# Patient Record
Sex: FEMALE | Race: WHITE | NOT HISPANIC OR LATINO | Employment: FULL TIME | URBAN - METROPOLITAN AREA
[De-identification: names, ages, dates, MRNs, and addresses within clinical notes are randomized per-mention and may not be internally consistent; named-entity substitution may affect disease eponyms.]

---

## 2018-01-29 ENCOUNTER — OFFICE VISIT CONVERTED (OUTPATIENT)
Dept: CARDIOLOGY | Facility: CLINIC | Age: 48
End: 2018-01-29
Attending: INTERNAL MEDICINE

## 2018-04-05 ENCOUNTER — OFFICE VISIT CONVERTED (OUTPATIENT)
Dept: UROLOGY | Facility: CLINIC | Age: 48
End: 2018-04-05
Attending: UROLOGY

## 2018-04-20 ENCOUNTER — OFFICE VISIT CONVERTED (OUTPATIENT)
Dept: SURGERY | Facility: CLINIC | Age: 48
End: 2018-04-20
Attending: SURGERY

## 2018-05-21 ENCOUNTER — OFFICE VISIT CONVERTED (OUTPATIENT)
Dept: CARDIOLOGY | Facility: CLINIC | Age: 48
End: 2018-05-21
Attending: INTERNAL MEDICINE

## 2018-06-29 ENCOUNTER — CONVERSION ENCOUNTER (OUTPATIENT)
Dept: CARDIOLOGY | Facility: CLINIC | Age: 48
End: 2018-06-29
Attending: INTERNAL MEDICINE

## 2019-03-26 ENCOUNTER — OFFICE VISIT CONVERTED (OUTPATIENT)
Dept: CARDIOLOGY | Facility: CLINIC | Age: 49
End: 2019-03-26
Attending: INTERNAL MEDICINE

## 2019-03-28 ENCOUNTER — HOSPITAL ENCOUNTER (OUTPATIENT)
Dept: INFUSION THERAPY | Facility: HOSPITAL | Age: 49
Setting detail: HOSPITAL OUTPATIENT SURGERY
Discharge: HOME | End: 2019-03-28
Attending: INTERNAL MEDICINE

## 2019-03-28 LAB
ANION GAP SERPL CALC-SCNC: 14 MMOL/L (ref 8–19)
BASOPHILS # BLD AUTO: 0.04 10*3/UL (ref 0–0.2)
BASOPHILS NFR BLD AUTO: 0.5 % (ref 0–3)
BUN SERPL-MCNC: 11 MG/DL (ref 5–25)
BUN/CREAT SERPL: 14 {RATIO} (ref 6–20)
CALCIUM SERPL-MCNC: 9.5 MG/DL (ref 8.7–10.4)
CHLORIDE SERPL-SCNC: 110 MMOL/L (ref 99–111)
CONV ABS IMM GRAN: 0.03 10*3/UL (ref 0–0.2)
CONV CO2: 24 MMOL/L (ref 22–32)
CONV IMMATURE GRAN: 0.4 % (ref 0–1.8)
CREAT UR-MCNC: 0.76 MG/DL (ref 0.5–0.9)
DEPRECATED RDW RBC AUTO: 42.3 FL (ref 36.4–46.3)
EOSINOPHIL # BLD AUTO: 0.12 10*3/UL (ref 0–0.7)
EOSINOPHIL # BLD AUTO: 1.6 % (ref 0–7)
ERYTHROCYTE [DISTWIDTH] IN BLOOD BY AUTOMATED COUNT: 12.4 % (ref 11.7–14.4)
GFR SERPLBLD BASED ON 1.73 SQ M-ARVRAT: >60 ML/MIN/{1.73_M2}
GLUCOSE SERPL-MCNC: 106 MG/DL (ref 65–99)
HBA1C MFR BLD: 12.7 G/DL (ref 12–16)
HCT VFR BLD AUTO: 38.4 % (ref 37–47)
INR PPP: 0.95 (ref 2–3)
LYMPHOCYTES # BLD AUTO: 3 10*3/UL (ref 1–5)
MCH RBC QN AUTO: 30.8 PG (ref 27–31)
MCHC RBC AUTO-ENTMCNC: 33.1 G/DL (ref 33–37)
MCV RBC AUTO: 93 FL (ref 81–99)
MONOCYTES # BLD AUTO: 0.47 10*3/UL (ref 0.2–1.2)
MONOCYTES NFR BLD AUTO: 6.2 % (ref 3–10)
NEUTROPHILS # BLD AUTO: 3.93 10*3/UL (ref 2–8)
NEUTROPHILS NFR BLD AUTO: 51.8 % (ref 30–85)
NRBC CBCN: 0 % (ref 0–0.7)
OSMOLALITY SERPL CALC.SUM OF ELEC: 298 MOSM/KG (ref 273–304)
PLATELET # BLD AUTO: 322 10*3/UL (ref 130–400)
PMV BLD AUTO: 9.6 FL (ref 9.4–12.3)
POTASSIUM SERPL-SCNC: 4 MMOL/L (ref 3.5–5.3)
PROTHROMBIN TIME: 10 S (ref 9.4–12)
RBC # BLD AUTO: 4.13 10*6/UL (ref 4.2–5.4)
SODIUM SERPL-SCNC: 144 MMOL/L (ref 135–147)
VARIANT LYMPHS NFR BLD MANUAL: 39.5 % (ref 20–45)
WBC # BLD AUTO: 7.59 10*3/UL (ref 4.8–10.8)

## 2019-07-02 ENCOUNTER — OFFICE VISIT CONVERTED (OUTPATIENT)
Dept: CARDIOLOGY | Facility: CLINIC | Age: 49
End: 2019-07-02
Attending: INTERNAL MEDICINE

## 2019-09-21 ENCOUNTER — HOSPITAL ENCOUNTER (OUTPATIENT)
Dept: URGENT CARE | Facility: CLINIC | Age: 49
Discharge: HOME | End: 2019-09-21

## 2019-09-23 LAB — BACTERIA UR CULT: NORMAL

## 2019-09-30 ENCOUNTER — OFFICE VISIT CONVERTED (OUTPATIENT)
Dept: SURGERY | Facility: CLINIC | Age: 49
End: 2019-09-30
Attending: PHYSICIAN ASSISTANT

## 2019-09-30 ENCOUNTER — CONVERSION ENCOUNTER (OUTPATIENT)
Dept: SURGERY | Facility: CLINIC | Age: 49
End: 2019-09-30

## 2019-09-30 ENCOUNTER — HOSPITAL ENCOUNTER (OUTPATIENT)
Dept: SURGERY | Facility: CLINIC | Age: 49
Discharge: HOME | End: 2019-09-30
Attending: PHYSICIAN ASSISTANT

## 2019-10-02 LAB — BACTERIA UR CULT: NORMAL

## 2020-01-20 ENCOUNTER — OFFICE VISIT CONVERTED (OUTPATIENT)
Dept: CARDIOLOGY | Facility: CLINIC | Age: 50
End: 2020-01-20
Attending: INTERNAL MEDICINE

## 2020-11-02 ENCOUNTER — OFFICE VISIT CONVERTED (OUTPATIENT)
Dept: CARDIOLOGY | Facility: CLINIC | Age: 50
End: 2020-11-02
Attending: INTERNAL MEDICINE

## 2021-03-19 ENCOUNTER — HOSPITAL ENCOUNTER (OUTPATIENT)
Dept: OTHER | Facility: HOSPITAL | Age: 51
Discharge: HOME | End: 2021-03-19
Attending: FAMILY MEDICINE

## 2021-03-22 ENCOUNTER — OFFICE VISIT CONVERTED (OUTPATIENT)
Dept: UROLOGY | Facility: CLINIC | Age: 51
End: 2021-03-22
Attending: UROLOGY

## 2021-03-22 LAB
BILIRUB UR QL STRIP: NEGATIVE
COLOR UR: YELLOW
CONV BACTERIA IN URINE MICRO: 0
CONV CALCIUM OXALATE CRYSTALS /HPF IN URINE SEDIMENT BY MICROSCOPY: 0
CONV CLARITY OF URINE: CLEAR
CONV PROTEIN IN URINE BY AUTOMATED TEST STRIP: 100
CONV UROBILINOGEN IN URINE BY AUTOMATED TEST STRIP: 0.2
GLUCOSE UR QL: NEGATIVE
HGB UR QL STRIP: NORMAL
KETONES UR QL STRIP: NEGATIVE
LEUKOCYTE ESTERASE UR QL STRIP: NORMAL
NITRITE UR QL STRIP: NEGATIVE
PH UR STRIP.AUTO: 6.5 [PH]
RBC #/AREA URNS HPF: NORMAL /[HPF]
RENAL EPI CELLS #/AREA URNS HPF: 0 /[HPF]
SP GR UR: >=1.03
SQUAMOUS SPT QL MICRO: 0
WBC #/AREA URNS HPF: 0 /[HPF]

## 2021-03-23 ENCOUNTER — HOSPITAL ENCOUNTER (OUTPATIENT)
Dept: PREADMISSION TESTING | Facility: HOSPITAL | Age: 51
Discharge: HOME | End: 2021-03-23
Attending: UROLOGY

## 2021-03-24 LAB — SARS-COV-2 RNA SPEC QL NAA+PROBE: NOT DETECTED

## 2021-03-26 ENCOUNTER — HOSPITAL ENCOUNTER (OUTPATIENT)
Dept: PERIOP | Facility: HOSPITAL | Age: 51
Setting detail: HOSPITAL OUTPATIENT SURGERY
Discharge: HOME | End: 2021-03-26
Attending: UROLOGY

## 2021-03-31 ENCOUNTER — TELEMEDICINE CONVERTED (OUTPATIENT)
Dept: UROLOGY | Facility: CLINIC | Age: 51
End: 2021-03-31
Attending: UROLOGY

## 2021-04-03 LAB
COLOR STONE: NORMAL
COMPN STONE: NORMAL
CONV CA OXALATE DIHYDRATE: 95 %
CONV CALCULI COMMENT: NORMAL
CONV CALCULI DISCLAIMER: NORMAL
CONV CALCULI NOTE: NORMAL
CONV PHOTO (CALCULI): NORMAL
HYDROXYAPATITE: 5 %
SIZE STONE: NORMAL MM
WT STONE: 11 MG

## 2021-04-20 ENCOUNTER — CONVERSION ENCOUNTER (OUTPATIENT)
Dept: OTHER | Facility: HOSPITAL | Age: 51
End: 2021-04-20

## 2021-04-20 ENCOUNTER — OFFICE VISIT CONVERTED (OUTPATIENT)
Dept: CARDIOLOGY | Facility: CLINIC | Age: 51
End: 2021-04-20
Attending: INTERNAL MEDICINE

## 2021-05-10 NOTE — H&P
"   History and Physical      Patient Name: Mariam Rincon   Patient ID: [id number]   Sex: Female   YOB: 1970    Referring Provider: Hawa Daniels    Visit Date: March 22, 2021    Provider: Loretta Leal MD   Location: [facility]   Location Address: [address] [place] [address]  [id number]   Location Phone: [phone]          Chief Complaint  · \"I have a kidney stone\"      History Of Present Illness  The patient is an 51 year old /White female, who is sent by the Emergency Room physician, for the evaluation of right flank pain.   She has had symptoms recently, but none currently.   Recent diagnostic studies were done days ago and include stone protocol CT. The studies have shown a right renal stone. The right renal stone size is described as 1.5cm.   The patient has no additional complaints. She denies nausea, vomiting, fever, and chills.   The problem is made worse by no known factors. The problem is relieved by no known factors.   Her past medical history is negative for renal stones.   Her family history is non-contributory.       Past Medical History  Abdominal Pain; Acute allergic reaction, Alpha Gal positive; Anemia, Unspecified; Arthritis; Asthma; Bundle Branch Block, left, other; Diabetes; Fibromyalgia; Heart Disease; Hematuria; High blood pressure; Kidney calculus; Kidney stones; Migraine Headaches; Myofascial pain; Nephrolithiasis; Overactive bladder; Pain, Chronic Pain Syndrome; Pain, Joint; Shingles         Past Surgical History  Abdominoplasty; Cardiac Catherization; Cesarian Section; Cholecystectomy; Hysterectomy-Abdominal; Kidney Stone Surgery, Unspecified         Medication List  albuterol sulfate inhalation; aspirin 81 mg oral tablet,delayed release (DR/EC); carvedilol 6.25 mg oral tablet; cyclobenzaprine oral; estradiol 1 mg oral tablet; famotidine 40 mg oral tablet; fiber oral powder; Fish Oil Oral 50 mg; isosorbide dinitrate 30 mg oral " "tablet; losartan 50 mg oral tablet; ondansetron 4 mg oral tablet,disintegrating; oxybutynin chloride 5 mg oral tablet extended release 24hr; pantoprazole 40 mg oral tablet,delayed release (DR/EC); phenazopyridine 100 mg oral tablet; rosuvastatin 5 mg oral tablet; trazodone 50 mg Oral Tablet; Vitamin D3 oral         Allergy List  Adhesives; Antihistamine       Allergies Reconciled  Family Medical History  Colon Neoplasm, Sigmoid, Malignant; Lung Neoplasm, Malignant; Stroke; Heart Disease; Diabetes, unspecified type; Family history of colon cancer; Family history of breast cancer         Social History  Tobacco (Never)         Review of Systems  · Constitutional  o Denies  o : fever, chills  · Eyes  o Denies  o : double vision, cataracts  · HENT  o Denies  o : hearing loss, headaches  · Cardiovascular  o Denies  o : chest pain at rest, chest pain with exercise, irregular heart beats, palpitations, leg cramps with exercise  · Respiratory  o Denies  o : shortness of breath, wheezing, sleep apnea  · Gastrointestinal  o Denies  o : heartburn or indigestion, nausea or vomiting, change in abdominal girth, diarrhea, constipation, blood in stools  · Genitourinary  o Admits  o : additional symptoms as noted in HPI  · Integument  o Denies  o : rash, new skin lesions  · Neurologic  o Denies  o : memory difficulties, headache, mini-strokes, seizures  · Endocrine  o Denies  o : hot flashes, thyroid disorders  · Psychiatric  o Denies  o : depression, schizophrenia, bipolar disorder  · Heme-Lymph  o Denies  o : easy bleeding, easy bruising, sickle cell disease or trait, lymph node enlargement or tenderness  · Allergic-Immunologic  o Denies  o : immune deficiency, HIV, Hepatitis C      Vitals  Date Time BP Position Site L\R Cuff Size HR RR TEMP (F) WT  HT  BMI kg/m2 BSA m2 O2 Sat FR L/min FiO2 HC       03/22/2021 04:34 /77 Sitting       191lbs 0oz 5'  6\" 30.83 2.01             Physical " Examination  · Constitutional  o Appearance  o : Well nourished, well developed patient in no acute distress. Ambulating without difficulty.  · Respiratory  o Respiratory Effort  o : Breathing is unlabored without accessory muscle use  · Skin and Subcutaneous Tissue  o General Inspection  o : No rashes, lesions or areas of discoloration present. Skin turgor is normal.          Results  · In-Office Procedures  o Lab procedure  § Automated dipstick urinalysis with microscopy ([id number])   § Color Ur: Yellow   § Clarity Ur: Clear   § Glucose Ur Ql Strip: Negative   § Bilirub Ur Ql Strip: Negative   § Ketones Ur Ql Strip: Negative   § Sp Gr Ur Qn: >=1.030   § Hgb Ur Ql Strip: Large   § pH Ur-LsCnc: 6.5   § Prot Ur Ql Strip: 100   § Urobilinogen Ur Strip-mCnc: 0.2   § Nitrite Ur Ql Strip: Negative   § WBC Est Ur Ql Strip: Small   § RBC UrnS Qn HPF: tntc   § WBC UrnS Qn HPF: 0   § Bacteria UrnS Qn HPF: 0   § Crystals UrnS Qn HPF: 0   § Epithelial Cells (non renal): 0 /HPF  § Epithelial Cells (renal): 0       Assessment  · Nephrolithiasis     592.0/N20.0      Plan  · Medications  o Medications have been Reconciled  o Transition of Care or Provider Policy  · Instructions  o DISCUSSION:  o The patient has developed a new eposide of stone disease. I have discussed the etiologies of stone disease with emphasis on treatment ,management and prevention. At this point, I think we can take a conservative approach. Patient will continue to strain all urine and RTC as instructed.   o Ureteroscopy and stone manipulation: Risks, benefits and alternatives were all explained. The patient understands the alternatives which include observation,open surgery, extracorporal shockwave lithotripsy and percutaneous nephrolithotomy. The plan is to perform ureteroscopy and stone manipulation with possible stent placement. Different techniques may be used to break up the stone. The patient clearly understands the risks which include but are not limited  to bleeding, infection, incomplete stone removal, stent pain and possible ureteral perforation. Multiple procedures may be needed to obtain a stone free state. All questions answered.  o PLAN:  o Schedule Ureteroscopy and Holmium laser litho and possible stent  o Electronically Identified Patient Education Materials Provided Electronically            Electronically Signed by: Loretta Leal MD -Author on March 22, 2021 05:06:12 PM

## 2021-05-13 NOTE — PROGRESS NOTES
Progress Note      Patient Name: Mariam Rincon   Patient ID: [id number]   Sex: Female   YOB: 1970    Primary Care Provider: Luis Alcala MD    Visit Date: November 2, 2020    Provider: Brendon Coy MD   Location: [facility]   Location Address: [address] [place] [address], [facility]   [place], [place]  [id number]   Location Phone: [phone]          Chief Complaint     Followup visit for coronary artery disease and hypertension.       History Of Present Illness  Mariam Rincon is a 50 year old /White female with nonobstructive coronary artery disease and a left bundle branch block who is here for a 9-month followup visit. For the past few days, her home blood pressure readings have been high with diastolic mostly at 100 and systolic in the 180s and sometimes even 200s. She reports some chest tightness when her blood pressure is high. Occasional palpitations are present. No pedal edema. No shortness of breath. Taking all the medications, including carvedilol, as prescribed.   PAST MEDICAL HISTORY: 1) Nonobstructive coronary artery disease. Cardiac catheterization done on 03/28/2019 showed 30-40% stenosis of the mid LAD; 2) Left bundle branch block; 3) Hypertension; 4) Hyperlipidemia.   PSYCHOSOCIAL HISTORY: Denies tobacco use. Rarely consumes alcohol.   CURRENT MEDICATIONS: Aspirin 81 mg daily; carvedilol 6.25 mg b.i.d.; rosuvastatin 5 mg daily; isosorbide mononitrate 30 mg daily; cyclobenzaprine 10 mg b.i.d.; oxybutynin 5 mg daily; Estradiol 1 mg daily; trazodone 50 mg daily; famotidine 20 mg daily; multivitamin daily.       Review of Systems  · Cardiovascular  o Admits  o : palpitations (fast, fluttering, or skipping beats), chest pain or angina pectoris   o Denies  o : swelling (feet, ankles, hands), shortness of breath while walking or lying flat  · Respiratory  o Denies  o : chronic or frequent cough, asthma or wheezing      Vitals  Date Time BP Position Site L\R Cuff Size HR  "RR TEMP (F) WT  HT  BMI kg/m2 BSA m2 O2 Sat FR L/min FiO2 HC       11/02/2020 02:53 /100 Sitting    74 - R   192lbs 0oz 5'  6\" 30.99 2.01       11/02/2020 02:53 /102 Sitting                       Physical Examination  · Respiratory  o Auscultation of Lungs  o : Clear to auscultation bilaterally. No crackles or rhonchi.  · Cardiovascular  o Heart  o : S1, S2 normally heard. No S3. No murmur, rubs, or gallops.  · Gastrointestinal  o Abdominal Examination  o : Soft, nontender, nondistended. No free fluid. Bowel sounds heard in all four quadrants.  · Extremities  o Extremities  o : Warm and well perfused. No pitting pedal edema. Distal pulses present.          Assessment     ASSESSMENT & PLAN:    1.  Hypertension.  Blood pressure not well controlled, and in fact, very high today.  She wants to go back to        Bystolic since that worked in the past.  We tried metoprolol and carvedilol without any major benefits.  We        will restart Bystolic at 10 mg p.o. once daily.  Until she gets the prescription filled, she is going to take        carvedilol 12.5 mg twice daily.  Recommended to keep a blood pressure log for the next 2 weeks, and if        her blood pressure remains uncontrolled, she will call us back.  2.  Nonobstructive coronary artery disease.  Continue aspirin, statin, and beta-blocker.  3.  Hyperlipidemia.  Continue Crestor.  Will check lipid panel before next visit.  4.  Will follow the blood pressure log; otherwise, follow up in 4 months.        MD BENJIE Aguilera:maya             Electronically Signed by: Jing Kate-, Other -Author on November 5, 2020 09:08:17 AM  Electronically Co-signed by: Brendon Coy MD -Reviewer on November 5, 2020 12:13:48 PM  "

## 2021-05-14 VITALS
WEIGHT: 192 LBS | SYSTOLIC BLOOD PRESSURE: 190 MMHG | BODY MASS INDEX: 30.86 KG/M2 | DIASTOLIC BLOOD PRESSURE: 100 MMHG | HEART RATE: 74 BPM | HEIGHT: 66 IN

## 2021-05-14 VITALS
DIASTOLIC BLOOD PRESSURE: 84 MMHG | WEIGHT: 188 LBS | HEIGHT: 66 IN | HEART RATE: 65 BPM | SYSTOLIC BLOOD PRESSURE: 150 MMHG | BODY MASS INDEX: 30.22 KG/M2

## 2021-05-14 VITALS
BODY MASS INDEX: 30.7 KG/M2 | HEIGHT: 66 IN | WEIGHT: 191 LBS | SYSTOLIC BLOOD PRESSURE: 149 MMHG | DIASTOLIC BLOOD PRESSURE: 77 MMHG

## 2021-05-14 NOTE — PROGRESS NOTES
Progress Note      Patient Name: Mariam Rincon   Patient ID: [id number]   Sex: Female   YOB: 1970    Primary Care Provider: Luis Alcala MD   Referring Provider: Hawa Daniels    Visit Date: April 20, 2021    Provider: Brendon Coy MD   Location: [facility]   Location Address: [address] [place] [address], [address]   [place]  [id number]   Location Phone: [phone]          Chief Complaint     Followup visit for coronary artery disease and hypertension.       History Of Present Illness  Mariam Rincon is a 51 year old /White female with nonobstructive coronary artery disease and hypertension who is here for a followup visit. She was seen in the office in November of last year. Previously, the patient was on Bystolic, and ever since the medication was changed to carvedilol due to insurance problems, patient's blood pressure was uncontrolled. She was seen by her primary care provider in February, and the systolic blood pressure was mostly in the 190s. She also felt symptoms, including dizziness, nausea, and palpitations. Her heart rate was on the lower side. The carvedilol dose was decreased to 6.25 mg, and she was put on losartan and ever since the blood pressure is better controlled. The palpitations have subsided. She occasionally gets chest pain, but very few times and not related to activities. Home blood pressure usually runs in the 140s.   PAST MEDICAL HISTORY: 1) Nonobstructive coronary artery disease. Cardiac catheterization done on 03/28/2019 showed 30-40% stenosis of the mid LAD; 2) Left bundle branch block; 3) Hypertension; 4) Hyperlipidemia.   PSYCHOSOCIAL HISTORY: Denies tobacco use. Rarely consumes alcohol.   CURRENT MEDICATIONS: Medications reviewed and are as documented.      ALLERGIES:  Adhesives; Antihistamine.       Review of Systems  · Cardiovascular  o Admits  o : shortness of breath while walking or lying flat, chest pain or angina pectoris  "  o Denies  o : palpitations (fast, fluttering, or skipping beats), swelling (feet, ankles, hands)  · Respiratory  o Denies  o : chronic or frequent cough      Vitals  Date Time BP Position Site L\R Cuff Size HR RR TEMP (F) WT  HT  BMI kg/m2 BSA m2 O2 Sat FR L/min FiO2        04/20/2021 03:07 /84 Sitting    65 - R   188lbs 0oz 5'  6\" 30.34 1.99             Physical Examination  · Respiratory  o Auscultation of Lungs  o : Clear to auscultation bilaterally. No crackles or rhonchi.  · Cardiovascular  o Heart  o : S1, S2 is normally heard. No S3. No murmur, rubs, or gallops.  · Gastrointestinal  o Abdominal Examination  o : Soft, nontender, nondistended. No free fluid. Bowel sounds heard in all four quadrants.  · Extremities  o Extremities  o : Warm and well perfused. No pitting pedal edema. Distal pulses present.  · Labs  o Labs  o : No recent labs available for review.          Assessment     ASSESSMENT & PLAN:    1.  Hypertension.  Blood pressure is reasonably well controlled with the carvedilol and losartan, which I will        continued.  She is advised to keep a blood pressure log.  If the systolic blood pressure is mostly in the        150s, she will call us back, and at that time, the losartan dose can be increased to 75 mg daily.    2.  Nonobstructive coronary artery disease.  Currently no angina-like symptoms.  Continue isosorbide, along        with aspirin and statin.    3.  Hyperlipidemia.  Continue rosuvastatin.  4.  Follow up in 6 months.             Electronically Signed by: Jing Kate-, Other -Author on April 24, 2021 05:18:35 PM  Electronically Co-signed by: Brendon Coy MD -Reviewer on April 25, 2021 08:58:14 AM  "

## 2021-05-14 NOTE — PROGRESS NOTES
Progress Note      Patient Name: Mariam Rincon   Patient ID: [id number]   Sex: Female   YOB: 1970    Referring Provider: Hawa Daniels    Visit Date: March 31, 2021    Provider: Loretta Leal MD   Location: [facility]   Location Address: [address] [place] [address]  [id number]   Location Phone: [phone]          Chief Complaint  · First postoperative visit      History Of Present Illness  Video Conferencing Visit  Mariam Rincon is a 51 year old /White female who is presenting for evaluation via video conferencing via [employer]. Verbal consent obtained before beginning visit.   The following staff were present during this visit: Shweta Braden   The patient returns for a scheduled post-operative visit after undergoing right ureteroscopy and laser litho and right ureteroscopy and stone basket extraction for right ureteral calculus on 03/26/2021. A double J stent was inserted but has been removed by patient at home.   Since the procedure, the patient has had persistent flank pain.       Past Medical History  Abdominal pain; Acute allergic reaction, Alpha Gal positive; Anemia, Unspecified; Arthritis; Asthma; Bundle Branch Block, left, other; Diabetes; Fibromyalgia; Heart Disease; Hematuria; High blood pressure; Kidney calculus; Kidney Stones; Migraine Headaches; Myofascial pain; Nephrolithiasis; Overactive bladder; Pain, Chronic Pain Syndrome; Pain, Joint; Shingles         Past Surgical History  Abdominoplasty; Cardiac Catherization; Cesarian Section; Cholecystectomy; Hysterectomy-Abdominal; Kidney Stone Surgery, Unspecified         Medication List  albuterol sulfate inhalation; aspirin 81 mg oral tablet,delayed release (DR/EC); carvedilol 6.25 mg oral tablet; cyclobenzaprine oral; estradiol 1 mg oral tablet; famotidine 40 mg oral tablet; fiber oral powder; Fish Oil Oral 50 mg; isosorbide dinitrate 30 mg oral tablet; losartan 50 mg oral tablet;  ondansetron 4 mg oral tablet,disintegrating; oxybutynin chloride 5 mg oral tablet extended release 24hr; pantoprazole 40 mg oral tablet,delayed release (DR/EC); phenazopyridine 100 mg oral tablet; rosuvastatin 5 mg oral tablet; trazodone 50 mg Oral Tablet; Vitamin D3 oral         Allergy List  Adhesives; Antihistamine       Allergies Reconciled  Family Medical History  Colon Neoplasm, Sigmoid, Malignant; Lung Neoplasm, Malignant; Stroke; Heart Disease; Diabetes, unspecified type; Family history of colon cancer; Family history of breast cancer         Social History  Tobacco (Never)         Review of Systems  · Constitutional  o Denies  o : fever, chills  · Gastrointestinal  o Denies  o : nausea, vomiting, change in abdominal girth, diarrhea, constipation, blood in stools  · Genitourinary  o Denies  o : additional symptoms, except as noted in HPI      Physical Examination  · Constitutional  o Appearance  o : Well nourished, well developed patient in no acute distress. Ambulating without difficulty.              Assessment  · Calculi, ureter     592.1/N20.1      Plan  · Orders  o KUB xray [facility] (87729) - 592.1/N20.1 - 03/31/2022  · Medications  o Medications have been Reconciled  o Transition of Care or Provider Policy  · Instructions  o The right uteretral stent was removed at home without difficulty. The patient will return to the office in 12 months for KUB. I will see him/her sooner if he/she has any no stone symptoms. She will let me know if her pain persists.  o FOLLOW-UP:  o In 12 months            Electronically Signed by: Loretta Leal MD -Author on March 31, 2021 02:37:26 PM

## 2021-05-14 NOTE — PROGRESS NOTES
Progress Note      Patient Name: Mariam Rincon   Patient ID: [id number]   Sex: Female   YOB: 1970    Referring Provider: Hawa Daniels    Visit Date: March 22, 2021    Provider: Loretta Leal MD   Location: [facility]   Location Address: [address] [place] [address]  [id number]   Location Phone: [phone]          Chief Complaint  · Outpatient History & Physical / Surgical Orders      History Of Present Illness  [facility]  Outpatient History and Physical Surgical Orders  Preadmission Location: Phone Preadmission Time: 08:30 AM   Which Facility: [facility] Date: 03/26/2021 Preadmission Testing Date: 03/24/2021   Patient's Name: Mariam Rincon YOB: 1970   Chief complaint/history present illness: 1.6 CM Right Renal Pelvis Stone   Current Medication List: albuterol sulfate inhalation, aspirin 81 mg oral tablet,delayed release (DR/EC), carvedilol 6.25 mg oral tablet, cyclobenzaprine oral, estradiol 1 mg oral tablet, famotidine 40 mg oral tablet, fiber oral powder, Fish Oil Oral 50 mg, isosorbide dinitrate 30 mg oral tablet, losartan 50 mg oral tablet, ondansetron 4 mg oral tablet,disintegrating, oxybutynin chloride 5 mg oral tablet extended release 24hr, pantoprazole 40 mg oral tablet,delayed release (DR/EC), phenazopyridine 100 mg oral tablet, rosuvastatin 5 mg oral tablet, trazodone 50 mg Oral Tablet, and Vitamin D3 oral   Allergies: Adhesives and Antihistamine   Significant past medical: Abdominal Pain, Acute allergic reaction, Alpha Gal positive, Anemia, Unspecified, Arthritis, Asthma, Bundle Branch Block, left, other, Diabetes, Fibromyalgia, Heart Disease, Hematuria, High blood pressure, Kidney calculus, Kidney stones, Migraine Headaches, Myofascial pain, Nephrolithiasis, Overactive bladder, Pain, Chronic Pain Syndrome, Pain, Joint, and Shingles   Past Surgical History: Abdominoplasty, Cardiac Catherization,  "Cesarian Section, Cholecystectomy, Hysterectomy-Abdominal, and Kidney Stone Surgery, Unspecified   Examination of heart and lungs: Regular rate, rhythm, no murmur, gallop, rub, Breath sounds normal, no distress, and Abdomen soft, non-tender, BSx4 are positive         Allergy List    Allergies Reconciled  Vitals  Date Time BP Position Site L\R Cuff Size HR RR TEMP (F) WT  HT  BMI kg/m2 BSA m2 O2 Sat FR L/min FiO2 HC       03/22/2021 04:34 /77 Sitting       191lbs 0oz 5'  6\" 30.83 2.01                 Assessment  · Encounter for preoperative examination for general surgical procedure     V72.84/Z01.818  · Kidney calculus     592.0/N20.0      Plan  · Orders  o General Urology Surgery Order ([facility]) - V72.84/Z01.818, 592.0/N20.0 - 03/26/2021  o [facility] Pre-Op Covid-19 Screening (52347) - V72.84/Z01.818, 592.0/N20.0 - 03/23/2021  · Medications  o Medications have been Reconciled  o Transition of Care or Provider Policy  · Instructions  o *****Surgical Orders******  o Pre-Operative Orders: Sign permit for Right Ureteroscopy, Laser Lithotripsy, Insertion of Right Ureteral Stent  o ****Patient Status****  o Admit for INPATIENT services; Anticipated length of stay greater than two midnights  o Outpatient   o **PATIENT INSTRUCTED TO STOP ASPIRIN THREE (3) DAYS PRIOR TO PROCEDURE  o General Sedation  o IV Fluids: LR @ 100 cc/hour  o IV Antibiotics (on call to OR):  o Levaquin 500 mg IV OCTOR.  o RISK AND BENEFITS:  o Possible risks/complications, benefits and alternatives to surgical or invasive procedure have been explained to patient and/or legal guardian.  o Electronically Identified Patient Education Materials Provided Electronically            Electronically Signed by: Shweta Braden, -Author on March 22, 2021 05:08:53 PM  Electronically Co-signed by: Loretta Leal MD -Reviewer on March 22, 2021 05:18:15 PM  "

## 2021-05-15 VITALS
HEART RATE: 80 BPM | HEIGHT: 66 IN | WEIGHT: 197 LBS | DIASTOLIC BLOOD PRESSURE: 86 MMHG | BODY MASS INDEX: 31.66 KG/M2 | SYSTOLIC BLOOD PRESSURE: 146 MMHG

## 2021-05-15 VITALS — HEIGHT: 66 IN | RESPIRATION RATE: 16 BRPM | WEIGHT: 196 LBS | BODY MASS INDEX: 31.5 KG/M2

## 2021-05-15 VITALS
SYSTOLIC BLOOD PRESSURE: 158 MMHG | DIASTOLIC BLOOD PRESSURE: 100 MMHG | HEART RATE: 76 BPM | BODY MASS INDEX: 31.66 KG/M2 | WEIGHT: 197 LBS | HEIGHT: 66 IN

## 2021-05-15 VITALS
BODY MASS INDEX: 31.18 KG/M2 | DIASTOLIC BLOOD PRESSURE: 88 MMHG | HEIGHT: 66 IN | WEIGHT: 194 LBS | HEART RATE: 70 BPM | SYSTOLIC BLOOD PRESSURE: 150 MMHG

## 2021-05-16 VITALS
HEART RATE: 72 BPM | HEIGHT: 66 IN | WEIGHT: 197 LBS | DIASTOLIC BLOOD PRESSURE: 80 MMHG | SYSTOLIC BLOOD PRESSURE: 126 MMHG | BODY MASS INDEX: 31.66 KG/M2

## 2021-05-16 VITALS
SYSTOLIC BLOOD PRESSURE: 126 MMHG | BODY MASS INDEX: 32.16 KG/M2 | WEIGHT: 200.12 LBS | DIASTOLIC BLOOD PRESSURE: 82 MMHG | HEIGHT: 66 IN

## 2021-05-16 VITALS
BODY MASS INDEX: 31.98 KG/M2 | HEIGHT: 66 IN | HEART RATE: 64 BPM | DIASTOLIC BLOOD PRESSURE: 98 MMHG | WEIGHT: 199 LBS | SYSTOLIC BLOOD PRESSURE: 146 MMHG

## 2021-05-16 VITALS — HEIGHT: 66 IN | RESPIRATION RATE: 14 BRPM | WEIGHT: 199.37 LBS | BODY MASS INDEX: 32.04 KG/M2

## 2021-05-22 ENCOUNTER — TRANSCRIBE ORDERS (OUTPATIENT)
Dept: ADMINISTRATIVE | Facility: HOSPITAL | Age: 51
End: 2021-05-22

## 2021-05-22 DIAGNOSIS — Z12.31 VISIT FOR SCREENING MAMMOGRAM: Primary | ICD-10-CM

## 2021-06-16 ENCOUNTER — HOSPITAL ENCOUNTER (OUTPATIENT)
Dept: MAMMOGRAPHY | Facility: HOSPITAL | Age: 51
Discharge: HOME | End: 2021-06-16
Admitting: NURSE PRACTITIONER

## 2021-06-16 DIAGNOSIS — Z12.31 VISIT FOR SCREENING MAMMOGRAM: ICD-10-CM

## 2021-06-16 PROCEDURE — 77067 SCR MAMMO BI INCL CAD: CPT

## 2021-11-02 ENCOUNTER — OFFICE VISIT (OUTPATIENT)
Dept: CARDIOLOGY | Facility: CLINIC | Age: 51
End: 2021-11-02

## 2021-11-02 VITALS
HEART RATE: 68 BPM | BODY MASS INDEX: 30.22 KG/M2 | DIASTOLIC BLOOD PRESSURE: 90 MMHG | SYSTOLIC BLOOD PRESSURE: 150 MMHG | HEIGHT: 66 IN | WEIGHT: 188 LBS

## 2021-11-02 DIAGNOSIS — I25.10 CORONARY ARTERY DISEASE INVOLVING NATIVE CORONARY ARTERY OF NATIVE HEART WITHOUT ANGINA PECTORIS: Primary | ICD-10-CM

## 2021-11-02 DIAGNOSIS — E78.2 MIXED HYPERLIPIDEMIA: ICD-10-CM

## 2021-11-02 DIAGNOSIS — R53.83 FATIGUE, UNSPECIFIED TYPE: ICD-10-CM

## 2021-11-02 DIAGNOSIS — I10 ESSENTIAL HYPERTENSION: ICD-10-CM

## 2021-11-02 PROCEDURE — 99214 OFFICE O/P EST MOD 30 MIN: CPT | Performed by: INTERNAL MEDICINE

## 2021-11-02 RX ORDER — ROSUVASTATIN CALCIUM 5 MG/1
5 TABLET, COATED ORAL DAILY
COMMUNITY

## 2021-11-02 RX ORDER — LOSARTAN POTASSIUM 50 MG/1
50 TABLET ORAL DAILY
COMMUNITY
End: 2021-11-02 | Stop reason: SDUPTHER

## 2021-11-02 RX ORDER — ESTRADIOL 1 MG/1
1 TABLET ORAL DAILY
COMMUNITY
End: 2021-11-22

## 2021-11-02 RX ORDER — CARVEDILOL 6.25 MG/1
6.25 TABLET ORAL 2 TIMES DAILY WITH MEALS
COMMUNITY

## 2021-11-02 RX ORDER — OXYBUTYNIN CHLORIDE 5 MG/1
5 TABLET ORAL 2 TIMES DAILY
COMMUNITY
End: 2022-08-22 | Stop reason: SDUPTHER

## 2021-11-02 RX ORDER — ISOSORBIDE MONONITRATE 30 MG/1
30 TABLET, EXTENDED RELEASE ORAL DAILY
COMMUNITY

## 2021-11-02 RX ORDER — FAMOTIDINE 40 MG/1
40 TABLET, FILM COATED ORAL DAILY
COMMUNITY

## 2021-11-02 RX ORDER — LOSARTAN POTASSIUM 50 MG/1
75 TABLET ORAL DAILY
Qty: 135 TABLET | Refills: 2 | Status: SHIPPED | OUTPATIENT
Start: 2021-11-02 | End: 2023-02-13 | Stop reason: SDUPTHER

## 2021-11-02 RX ORDER — PANTOPRAZOLE SODIUM 40 MG/1
40 TABLET, DELAYED RELEASE ORAL DAILY
COMMUNITY

## 2021-11-02 NOTE — ASSESSMENT & PLAN NOTE
Nonobstructive coronary disease involving LAD artery per to previous cardiac catheterizations.  Currently no anginal-like symptoms.  Recommend to continue aspirin, statin and carvedilol.

## 2021-11-02 NOTE — PROGRESS NOTES
CARDIOLOGY FOLLOW-UP PROGRESS NOTE      Chief Complaint  Shortness of Breath, Fatigue, Coronary Artery Disease, and Hypertension    Subjective            Mariam Rincon presents to [facility] CARDIOLOGY  History of Present Illness    This is a 51-year-old female with nonobstructive coronary artery disease, left bundle branch block, hypertension hyperlipidemia. She is here for 6-month follow-up visit. She had a cardiac catheterization done for second time in 2019 which again did not show any obstructive CAD. She currently has no chest pains or history of angina however she still reports significant fatigue and shortness of breath on exertion. Denies any palpitations or dizziness. Her blood pressure is not well controlled per home readings and mostly in 150s over 90s. She is taking all the medications as prescribed.      Past History:    1) Nonobstructive coronary artery disease. Cardiac catheterization done on 2019 showed 30-40% stenosis of the mid LAD; 2) Left bundle branch block; 3) Hypertension; 4) Hyperlipidemia.     Medical History:  Past Medical History:   Diagnosis Date   • Arthritis    • Fibromyalgia    • History of transfusion    • Hypertension    • Stomach ulcer        Surgical History: has a past surgical history that includes  section; Cholecystectomy; Hysterectomy;  section; and Cholecystectomy.     Family History: family history includes Aneurysm in her brother; Breast cancer in her maternal aunt, maternal aunt, maternal cousin, maternal cousin, and maternal cousin; Heart disease in her father and mother; Stroke in her sister.     Social History: reports that she has never smoked. She has never used smokeless tobacco. She reports that she does not drink alcohol and does not use drugs.    Allergies: Antihistamines, diphenhydramine-type    Current Outpatient Medications on File Prior to Visit   Medication Sig   • aspirin 81 MG EC tablet Take 1 tablet by mouth  "Daily.   • carvedilol (COREG) 6.25 MG tablet Take 6.25 mg by mouth 2 (Two) Times a Day With Meals.   • estradiol (ESTRACE) 1 MG tablet Take 1 mg by mouth Daily.   • famotidine (PEPCID) 40 MG tablet Take 40 mg by mouth Daily.   • isosorbide mononitrate (IMDUR) 30 MG 24 hr tablet Take 30 mg by mouth Daily.   • oxybutynin (DITROPAN) 5 MG tablet Take 5 mg by mouth 2 (Two) Times a Day.   • pantoprazole (PROTONIX) 40 MG EC tablet Take 40 mg by mouth Daily.   • rosuvastatin (CRESTOR) 5 MG tablet Take 5 mg by mouth Daily.   • traZODone (DESYREL) 50 MG tablet Take 50 mg by mouth Every Night.   • Losartan (COZAAR) 50 MG tablet Take 50 mg by mouth Daily.            Review of Systems   Constitutional: Positive for fatigue.   Respiratory: Positive for shortness of breath (Exertional). Negative for cough and wheezing.    Cardiovascular: Negative for chest pain, palpitations and leg swelling.   Gastrointestinal: Negative for nausea and vomiting.   Neurological: Negative for dizziness and syncope.        Objective     /90   Pulse 68   Ht 167.6 cm (66\")   Wt 85.3 kg (188 lb)   BMI 30.34 kg/m²       Physical Exam    General : Alert, awake, no acute distress  Neck : Supple, no carotid bruit, no jugular venous distention  CVS : Regular rate and rhythm, no murmur, rubs or gallops  Lungs: Clear to auscultation bilaterally, no crackles or rhonchi  Abdomen: Soft, nontender, bowel sounds heard in all 4 quadrants  Extremities: Warm, well-perfused, no pedal edema      The following data was reviewed by: Brendon Coy MD on 11/02/2021:      No recent labs available for review           Data reviewed: Cardiology studies        Results for orders placed during the hospital encounter of 11/19/16    Adult transthoracic echo complete    Interpretation Summary  · All left ventricular wall segments contract normally.  · Left ventricular diastolic dysfunction (grade I a) consistent with impaired relaxation.  · Left atrial cavity size is " mildly dilated.  · Mild aortic valve regurgitation is present.  · Left ventricular function is normal.      Echocardiogram done on 6/29/2018 showed    1. Normal left ventricular systolic function with an estimated ejection fraction of 55-60%.   2. Mild asymmetric septal hypertrophy of the left ventricle with grade 1 diastolic dysfunction.   3. Mild mitral regurgitation.      4.  Mild aortic insufficiency.  5.  Mild-to-moderate tricuspid regurgitation with borderline elevated pulmonary artery systolic pressure.                  Assessment and Plan        Diagnoses and all orders for this visit:    1. Coronary artery disease involving native coronary artery of native heart without angina pectoris (Primary)  Assessment & Plan:  Nonobstructive coronary disease involving LAD artery per to previous cardiac catheterizations.  Currently no anginal-like symptoms.  Recommend to continue aspirin, statin and carvedilol.    Orders:  -     Lipid Panel; Future  -     Comprehensive Metabolic Panel; Future    2. Mixed hyperlipidemia  Assessment & Plan:  Continue Crestor 5 mg.  Will check lipid panel now and adjust the dose as needed    Orders:  -     Lipid Panel; Future  -     Comprehensive Metabolic Panel; Future    3. Essential hypertension  Assessment & Plan:  Blood pressure on the higher side in the office today and patient reports higher blood pressure at home as well.  We will increase the dose of losartan to 75 mg once daily and continue carvedilol 6.25 mg twice daily.  She will keep blood pressure log at home.  Will check complete metabolic panel now.    Orders:  -     losartan (COZAAR) 50 MG tablet; Take 1.5 tablets by mouth Daily.  Dispense: 135 tablet; Refill: 2    4. Fatigue, unspecified type  Assessment & Plan:  Appears to be a predominant symptom for the past several years.  Cardiac work-up including to cardiac catheterizations, echocardiogram did not show any definite etiology.  She has left bundle branch block at  baseline which is unlikely to cause similar symptoms.  LV function is preserved.  Recommend to continue with steady and regular exercise regimen.              Follow Up     Return in about 6 months (around 5/2/2022) for Recheck, Next scheduled follow up.    Patient was given instructions and counseling regarding her condition or for health maintenance advice. Please see specific information pulled into the AVS if appropriate.

## 2021-11-02 NOTE — ASSESSMENT & PLAN NOTE
Blood pressure on the higher side in the office today and patient reports higher blood pressure at home as well.  We will increase the dose of losartan to 75 mg once daily and continue carvedilol 6.25 mg twice daily.  She will keep blood pressure log at home.  Will check complete metabolic panel now.

## 2021-11-02 NOTE — ASSESSMENT & PLAN NOTE
Appears to be a predominant symptom for the past several years.  Cardiac work-up including to cardiac catheterizations, echocardiogram did not show any definite etiology.  She has left bundle branch block at baseline which is unlikely to cause similar symptoms.  LV function is preserved.  Recommend to continue with steady and regular exercise regimen.

## 2021-11-09 ENCOUNTER — PATIENT MESSAGE (OUTPATIENT)
Dept: CARDIOLOGY | Facility: CLINIC | Age: 51
End: 2021-11-09

## 2021-11-22 DIAGNOSIS — Z79.890 HORMONE REPLACEMENT THERAPY (HRT): Primary | ICD-10-CM

## 2021-11-22 RX ORDER — ESTRADIOL 1 MG/1
TABLET ORAL
Qty: 90 TABLET | Refills: 0 | Status: SHIPPED | OUTPATIENT
Start: 2021-11-22 | End: 2022-02-22

## 2021-11-22 NOTE — TELEPHONE ENCOUNTER
Rx Refill Note  Requested Prescriptions     Pending Prescriptions Disp Refills   • estradiol (ESTRACE) 1 MG tablet [Pharmacy Med Name: ESTRADIOL 1 MG TABLET] 90 tablet PRN     Sig: TAKE 1 TABLET BY MOUTH EVERY DAY      Last office visit with prescribing clinician: PATIENT LAST SEEN ON 11-3-20 RX GIVEN FOR ESTRADIOL 1MG X 1 YR      Next office visit with prescribing clinician: NO APPOINTMENT SCHEDULED            Kamilla Fernandez MA  11/22/21, 10:11 EST

## 2021-12-02 ENCOUNTER — PATIENT MESSAGE (OUTPATIENT)
Dept: CARDIOLOGY | Facility: CLINIC | Age: 51
End: 2021-12-02

## 2022-02-22 DIAGNOSIS — Z79.890 HORMONE REPLACEMENT THERAPY (HRT): ICD-10-CM

## 2022-02-22 RX ORDER — ESTRADIOL 1 MG/1
TABLET ORAL
Qty: 90 TABLET | Refills: 0 | Status: SHIPPED | OUTPATIENT
Start: 2022-02-22 | End: 2022-05-23

## 2022-02-22 NOTE — TELEPHONE ENCOUNTER
Rx Refill Note  Requested Prescriptions     Pending Prescriptions Disp Refills   • estradiol (ESTRACE) 1 MG tablet [Pharmacy Med Name: ESTRADIOL 1 MG TABLET] 90 tablet 0     Sig: TAKE 1 TABLET BY MOUTH EVERY DAY      Last office visit with prescribing clinician: Last seen 11-3-20      Next office visit with prescribing clinician: Next appointment 3-11-22           Kamilla Fernandez MA  02/22/22, 07:50 EST

## 2022-05-09 ENCOUNTER — OFFICE VISIT (OUTPATIENT)
Dept: CARDIOLOGY | Facility: CLINIC | Age: 52
End: 2022-05-09

## 2022-05-09 VITALS
HEART RATE: 62 BPM | SYSTOLIC BLOOD PRESSURE: 132 MMHG | BODY MASS INDEX: 31.02 KG/M2 | WEIGHT: 193 LBS | HEIGHT: 66 IN | DIASTOLIC BLOOD PRESSURE: 70 MMHG

## 2022-05-09 DIAGNOSIS — E78.2 MIXED HYPERLIPIDEMIA: ICD-10-CM

## 2022-05-09 DIAGNOSIS — I10 ESSENTIAL HYPERTENSION: Primary | ICD-10-CM

## 2022-05-09 DIAGNOSIS — I25.10 CORONARY ARTERY DISEASE INVOLVING NATIVE CORONARY ARTERY OF NATIVE HEART WITHOUT ANGINA PECTORIS: ICD-10-CM

## 2022-05-09 PROBLEM — I44.7 LBBB (LEFT BUNDLE BRANCH BLOCK): Status: ACTIVE | Noted: 2022-05-09

## 2022-05-09 PROCEDURE — 99213 OFFICE O/P EST LOW 20 MIN: CPT | Performed by: INTERNAL MEDICINE

## 2022-05-09 RX ORDER — NITROGLYCERIN 0.4 MG/1
0.4 TABLET SUBLINGUAL
COMMUNITY

## 2022-05-09 RX ORDER — DULOXETIN HYDROCHLORIDE 30 MG/1
30 CAPSULE, DELAYED RELEASE ORAL DAILY
COMMUNITY

## 2022-05-09 NOTE — ASSESSMENT & PLAN NOTE
Currently denies any chest pain.  She does report exertional shortness of breath.  Cardiac cath in 2019 did not show any obstructive CAD.  No further cardiac work-up is needed at this time.  Recommend to continue aspirin, statin beta-blocker and long-acting nitrate at the current dose.  LV function is preserved.

## 2022-05-09 NOTE — ASSESSMENT & PLAN NOTE
Blood pressure very well controlled in office today and also per home blood pressure log.  Continue Coreg 6.2 mg twice daily and losartan 75 mg once daily.  We will get the latest labs from PCP office.

## 2022-05-09 NOTE — PROGRESS NOTES
CARDIOLOGY FOLLOW-UP PROGRESS NOTE        Chief Complaint  Coronary Artery Disease, Hypertension, and Hyperlipidemia    Subjective            Mariam Rincon presents to [facility] CARDIOLOGY  History of Present Illness      Ms. Buckner is here for routine 6-month follow-up visit.  She denies having any chest pain at this time.  She does have shortness of breath on moderate exertion including climbing stairs.  This is stable for the past several months.  Blood pressures well controlled at home.  Of note, the dose of losartan was increased to 75 mg daily during last office visit.       Past History:    1) Nonobstructive coronary artery disease. Cardiac catheterization done on 2019 showed 30-40% stenosis of the mid LAD; 2) Left bundle branch block; 3) Hypertension; 4) Hyperlipidemia.     Medical History:  Past Medical History:   Diagnosis Date   • Arthritis    • Coronary artery disease    • Fibromyalgia    • History of transfusion    • Hyperlipidemia    • Hypertension    • Stomach ulcer        Surgical History: has a past surgical history that includes  section; Cholecystectomy; Hysterectomy;  section; and Cholecystectomy.     Family History: family history includes Aneurysm in her brother; Breast cancer in her maternal aunt, maternal aunt, maternal cousin, maternal cousin, and maternal cousin; Heart disease in her father and mother; Stroke in her sister.     Social History: reports that she has never smoked. She has never used smokeless tobacco. She reports that she does not drink alcohol and does not use drugs.    Allergies: Antihistamines, diphenhydramine-type    Current Outpatient Medications on File Prior to Visit   Medication Sig   • aspirin 81 MG EC tablet Take 1 tablet by mouth Daily.   • carvedilol (COREG) 6.25 MG tablet Take 6.25 mg by mouth 2 (Two) Times a Day With Meals.   • estradiol (ESTRACE) 1 MG tablet TAKE 1 TABLET BY MOUTH EVERY DAY   • famotidine (PEPCID) 40  "MG tablet Take 40 mg by mouth Daily.   • isosorbide mononitrate (IMDUR) 30 MG 24 hr tablet Take 30 mg by mouth Daily.   • losartan (COZAAR) 50 MG tablet Take 1.5 tablets by mouth Daily.   • nitroglycerin (NITROSTAT) 0.4 MG SL tablet Place 0.4 mg under the tongue Every 5 (Five) Minutes As Needed for Chest Pain. Take no more than 3 doses in 15 minutes.   • oxybutynin (DITROPAN) 5 MG tablet Take 5 mg by mouth 2 (Two) Times a Day.   • pantoprazole (PROTONIX) 40 MG EC tablet Take 40 mg by mouth Daily.   • rosuvastatin (CRESTOR) 5 MG tablet Take 5 mg by mouth Daily.   • traZODone (DESYREL) 50 MG tablet Take 50 mg by mouth Every Night.   • DULoxetine (CYMBALTA) 30 MG capsule Take 30 mg by mouth Daily.     No current facility-administered medications on file prior to visit.          Review of Systems   Respiratory: Positive for shortness of breath. Negative for cough and wheezing.    Cardiovascular: Negative for chest pain, palpitations and leg swelling.   Gastrointestinal: Negative for nausea and vomiting.   Neurological: Negative for dizziness and syncope.        Objective     /70 (BP Location: Left arm)   Pulse 62   Ht 167.6 cm (66\")   Wt 87.5 kg (193 lb)   BMI 31.15 kg/m²       Physical Exam    General : Alert, awake, no acute distress  Neck : Supple, no carotid bruit, no jugular venous distention  CVS : Regular rate and rhythm, no murmur, rubs or gallops  Lungs: Clear to auscultation bilaterally, no crackles or rhonchi  Abdomen: Soft, nontender, bowel sounds heard in all 4 quadrants  Extremities: Warm, well-perfused, no pedal edema    Result Review :     The following data was reviewed by: Brendon Coy MD on 05/09/2022:                 Data reviewed: Cardiology studies        Echocardiogram done on June 29, 2018 showed    1. Normal left ventricular systolic function with an estimated EF of 55-60%.   2. Mild asymmetric septal hypertrophy of the left ventricle with grade 1 diastolic dysfunction.   3. Mild " mitral regurgitation.   4.  Mild aortic insufficiency.  5.  Mild-to-moderate tricuspid regurgitation with borderline elevated pulmonary artery systolic pressure.                    Assessment and Plan        Diagnoses and all orders for this visit:    1. Essential hypertension (Primary)  Assessment & Plan:  Blood pressure very well controlled in office today and also per home blood pressure log.  Continue Coreg 6.2 mg twice daily and losartan 75 mg once daily.  We will get the latest labs from PCP office.      2. Coronary artery disease involving native coronary artery of native heart without angina pectoris  Assessment & Plan:  Currently denies any chest pain.  She does report exertional shortness of breath.  Cardiac cath in 2019 did not show any obstructive CAD.  No further cardiac work-up is needed at this time.  Recommend to continue aspirin, statin beta-blocker and long-acting nitrate at the current dose.  LV function is preserved.      3. Mixed hyperlipidemia  Assessment & Plan:  Lipid control uncertain, will get latest labs from PCP office and adjust the dose of Crestor if needed.              Follow Up     Return in about 9 months (around 2/9/2023) for Next scheduled follow up.    Patient was given instructions and counseling regarding her condition or for health maintenance advice. Please see specific information pulled into the AVS if appropriate.

## 2022-05-09 NOTE — ASSESSMENT & PLAN NOTE
Lipid control uncertain, will get latest labs from PCP office and adjust the dose of Crestor if needed.

## 2022-05-13 ENCOUNTER — CLINICAL SUPPORT (OUTPATIENT)
Dept: GASTROENTEROLOGY | Facility: CLINIC | Age: 52
End: 2022-05-13

## 2022-05-13 ENCOUNTER — PREP FOR SURGERY (OUTPATIENT)
Dept: OTHER | Facility: HOSPITAL | Age: 52
End: 2022-05-13

## 2022-05-13 DIAGNOSIS — Z12.11 ENCOUNTER FOR SCREENING FOR MALIGNANT NEOPLASM OF COLON: Primary | ICD-10-CM

## 2022-05-22 DIAGNOSIS — Z79.890 HORMONE REPLACEMENT THERAPY (HRT): ICD-10-CM

## 2022-05-23 RX ORDER — ESTRADIOL 1 MG/1
TABLET ORAL
Qty: 90 TABLET | Refills: 0 | Status: SHIPPED | OUTPATIENT
Start: 2022-05-23 | End: 2022-05-31 | Stop reason: SDUPTHER

## 2022-05-31 ENCOUNTER — OFFICE VISIT (OUTPATIENT)
Dept: OBSTETRICS AND GYNECOLOGY | Facility: CLINIC | Age: 52
End: 2022-05-31

## 2022-05-31 VITALS
WEIGHT: 188 LBS | BODY MASS INDEX: 30.22 KG/M2 | SYSTOLIC BLOOD PRESSURE: 120 MMHG | DIASTOLIC BLOOD PRESSURE: 76 MMHG | HEIGHT: 66 IN | HEART RATE: 74 BPM

## 2022-05-31 DIAGNOSIS — Z79.890 HORMONE REPLACEMENT THERAPY (HRT): ICD-10-CM

## 2022-05-31 DIAGNOSIS — Z01.419 WELL WOMAN EXAM: Primary | ICD-10-CM

## 2022-05-31 PROCEDURE — 99396 PREV VISIT EST AGE 40-64: CPT | Performed by: NURSE PRACTITIONER

## 2022-05-31 RX ORDER — ESTRADIOL 1 MG/1
1 TABLET ORAL DAILY
Qty: 90 TABLET | Refills: 3 | Status: SHIPPED | OUTPATIENT
Start: 2022-05-31

## 2022-05-31 NOTE — PROGRESS NOTES
"GYN Problem/Follow Up Visit    Chief Complaint   Patient presents with   • Gynecologic Exam              HPI:   52 y.o..Presents for well woman exam. Contraception or HRT: s/p Supracervical HYST BSO, benign indications,  taking systemic HRT for management of hot flashes and anxiety  Menses:   No vaginal bleeding  Pain:  None  Last pap normal   Complaints: Pt has no complaints today.   Patient reports that she is not currently experiencing any symptoms of urinary incontinence.      Past Medical History:   Diagnosis Date   • Arthritis    • Coronary artery disease    • Fibromyalgia    • History of transfusion    • Hyperlipidemia    • Hypertension    • Stomach ulcer       Past Surgical History:   Procedure Laterality Date   •  SECTION     •  SECTION     • CHOLECYSTECTOMY     • HYSTERECTOMY      Supracervical, adhesions and pain   • TUBAL ABDOMINAL LIGATION        Family History   Problem Relation Age of Onset   • Colon cancer Father 70   • Heart disease Father    • Heart disease Mother    • Aneurysm Brother    • Stroke Sister    • Breast cancer Paternal Aunt    • Breast cancer Paternal Aunt         PCP: does manage PMHx and preventative labs    PHYSICAL EXAM: Chaperone present /76   Pulse 74   Ht 167 cm (65.75\")   Wt 85.3 kg (188 lb)   BMI 30.58 kg/m²   General- NAD, alert and oriented, appropriate  Psych- Normal mood, good memory  Neck- No masses, no thyroid enlargement  Lymphatic- No palpable neck, axillary, or groin nodes  CV- Regular rhythm, no murmurs  Resp- CTA to bases, no wheezes  Abdomen- Soft, non distended, non tender, no masses  Breast left-  Bilaterally symmetrical, no masses, non tender, no nipple discharge  Breast right- Bilaterally symmetrical, no masses, non tender, no nipple discharge  External genitalia- Normal female, no lesions  Urethra/meatus- Normal, no masses, non tender, no prolapse  Bladder- Normal, no masses, non tender, no prolapse  Vagina- Normal, no atrophy, " no lesions, no discharge, no prolapse  Cvx- Normal, no lesions, no discharge, No cervical motion tenderness  Uterus- Absent  Adnexa- Absent  Anus/Rectum/Perineum- Not performed  Ext- No edema, no cyanosis    Skin- No lesions, no rashes, no acanthosis nigricans       ASSESSMENT and PLAN:    Diagnoses and all orders for this visit:    1. Well woman exam (Primary)    2. Hormone replacement therapy (HRT)  -     estradiol (ESTRACE) 1 MG tablet; Take 1 tablet by mouth Daily.  Dispense: 90 tablet; Refill: 3      Preventative:   BREAST HEALTH- Monthly self breast exam importance and how to reviewed. MMG and/or MRI (prn) reviewed per society guidelines and her individual history. Screen: Already up to date  CERVICAL CANCER Screening- Reviewed current ASCCP guidelines for screening w and wo cotest HPV, age specific.  Screen: Already up to date  COLON CANCER Screening- Reviewed current medical society guidelines and options.  Screen:  scheduled with Dr. Hammond  BONE HEALTH- Reviewed current medical society guidelines and options for testing, calcium and vit D intake.  Weight bearing exercise.  DEXA: Updated today  Follow up PCP/Specialist PMHx and Labs  Myriad: Does not qualify.  HRT R/B/A/SE/E all options including non-FDA approved options discussed w pt.         HRT- I recommend the lowest dose possible, for the shortest period of time.  Risks HRT NLT breast CA (progestin), CVA, MI, STEVO.        She understands the importance of having any ordered tests to be performed in a timely fashion.  The risks of not performing them include, but are not limited to, advanced cancer stages, bone loss from osteoporosis and/or subsequent increase in morbidity and/or mortality.  She is encouraged to review her results online and/or contact or office if she has questions.     Follow Up:  Return in 1 year (on 5/31/2023).        Janet Babcock, MARLO  05/31/2022

## 2022-07-18 ENCOUNTER — TELEPHONE (OUTPATIENT)
Dept: GASTROENTEROLOGY | Facility: CLINIC | Age: 52
End: 2022-07-18

## 2022-07-18 NOTE — TELEPHONE ENCOUNTER
We rec'd notification from ENDO that patient needed to cx Colonoscopy due to having an allergic reaction. I tried to call patient to get this rescheduled. No answer. Left message. Letter sent to patient.

## 2022-08-22 ENCOUNTER — TELEPHONE (OUTPATIENT)
Dept: UROLOGY | Facility: CLINIC | Age: 52
End: 2022-08-22

## 2022-08-22 DIAGNOSIS — N20.0 KIDNEY STONE: Primary | ICD-10-CM

## 2022-08-22 DIAGNOSIS — N32.81 OAB (OVERACTIVE BLADDER): Primary | ICD-10-CM

## 2022-08-22 RX ORDER — OXYBUTYNIN CHLORIDE 5 MG/1
5 TABLET ORAL 2 TIMES DAILY
Qty: 60 TABLET | Refills: 3 | Status: SHIPPED | OUTPATIENT
Start: 2022-08-22 | End: 2022-11-18

## 2022-08-22 NOTE — TELEPHONE ENCOUNTER
DELETE AFTER REVIEWING: Send the encounter HIGH priority, If patient has less than a 3 day supply. If the patient will run out of medication over the weekend add that information to the additional details line. Send this encounter to the clinical pool.    Caller: SANTY RAZO    Relationship:  SELF    Best call back number: [phone]    Requested Prescriptions:   Requested Prescriptions     Pending Prescriptions Disp Refills   • oxybutynin (DITROPAN) 5 MG tablet       Sig: Take 1 tablet by mouth 2 (Two) Times a Day.        Pharmacy where request should be sent:    [facility]  [address]  [phone]    Additional details provided by patient: PT SAID SHE IS OUT OF THE MEDS    Does the patient have less than a 3 day supply:  [] Yes  [x] No

## 2022-08-22 NOTE — TELEPHONE ENCOUNTER
DELETE AFTER REVIEWING: Telephone encounter to be sent to the clinical pool     Caller: Mariam Rincon    Relationship: Self    Best call back number: [phone]    What orders are you requesting (i.e. lab or imaging): KUB    In what timeframe would the patient need to come in: ASAP    Where will you receive your lab/imaging services: [facility] OR [facility]    Additional notes: PT SAID SHE WOULD LIKE TO HAVE KUB COMPLETED AT THE FACILITY WITH THE LATEST OPENING

## 2022-08-22 NOTE — TELEPHONE ENCOUNTER
Per Brenden:  Will you call this PT and tell her that the KUB order is in and she can have that done at the hospital or [facility]? No appt needed. I believe 6 is the latest it can be done at the hospital.     I called the patient and made her aware of the message, mariola Wilcox.

## 2022-09-02 ENCOUNTER — HOSPITAL ENCOUNTER (OUTPATIENT)
Dept: GENERAL RADIOLOGY | Facility: HOSPITAL | Age: 52
Discharge: HOME | End: 2022-09-02
Admitting: UROLOGY

## 2022-09-02 DIAGNOSIS — N20.0 KIDNEY STONE: ICD-10-CM

## 2022-09-02 PROCEDURE — 74018 RADEX ABDOMEN 1 VIEW: CPT

## 2022-10-06 ENCOUNTER — TRANSCRIBE ORDERS (OUTPATIENT)
Dept: ADMINISTRATIVE | Facility: HOSPITAL | Age: 52
End: 2022-10-06

## 2022-10-06 DIAGNOSIS — Z12.31 SCREENING MAMMOGRAM FOR BREAST CANCER: Primary | ICD-10-CM

## 2022-10-14 ENCOUNTER — APPOINTMENT (OUTPATIENT)
Dept: MAMMOGRAPHY | Facility: HOSPITAL | Age: 52
End: 2022-10-14

## 2022-11-18 ENCOUNTER — OFFICE VISIT (OUTPATIENT)
Dept: UROLOGY | Facility: CLINIC | Age: 52
End: 2022-11-18

## 2022-11-18 VITALS — HEIGHT: 66 IN | BODY MASS INDEX: 30.6 KG/M2 | WEIGHT: 190.4 LBS

## 2022-11-18 DIAGNOSIS — N32.81 OAB (OVERACTIVE BLADDER): Primary | ICD-10-CM

## 2022-11-18 DIAGNOSIS — N20.0 RENAL STONES: ICD-10-CM

## 2022-11-18 LAB
BILIRUB BLD-MCNC: NEGATIVE MG/DL
CLARITY, POC: CLEAR
COLOR UR: YELLOW
EXPIRATION DATE: NORMAL
GLUCOSE UR STRIP-MCNC: NEGATIVE MG/DL
KETONES UR QL: NEGATIVE
LEUKOCYTE EST, POC: NEGATIVE
Lab: NORMAL
NITRITE UR-MCNC: NEGATIVE MG/ML
PH UR: 5.5 [PH] (ref 5–8)
PROT UR STRIP-MCNC: NEGATIVE MG/DL
RBC # UR STRIP: NEGATIVE /UL
SP GR UR: 1.03 (ref 1–1.03)
UROBILINOGEN UR QL: NORMAL

## 2022-11-18 PROCEDURE — 81003 URINALYSIS AUTO W/O SCOPE: CPT | Performed by: UROLOGY

## 2022-11-18 PROCEDURE — 99213 OFFICE O/P EST LOW 20 MIN: CPT | Performed by: UROLOGY

## 2022-11-18 RX ORDER — GABAPENTIN 100 MG/1
100 CAPSULE ORAL
COMMUNITY
Start: 2022-10-06 | End: 2023-02-13 | Stop reason: ALTCHOICE

## 2022-11-18 RX ORDER — PREDNISOLONE ACETATE 10 MG/ML
SUSPENSION/ DROPS OPHTHALMIC
COMMUNITY
Start: 2022-10-25

## 2022-11-18 RX ORDER — OXYBUTYNIN CHLORIDE 5 MG/1
5 TABLET, EXTENDED RELEASE ORAL DAILY
Qty: 30 TABLET | Refills: 11 | Status: SHIPPED | OUTPATIENT
Start: 2022-11-18

## 2022-11-18 NOTE — PROGRESS NOTES
"Chief Complaint  Kidney stones    Subjective          Mariam Rincon presents to [facility] UROLOGY  History of Present Illness  The patient reports that she is still experiencing trouble with her bladder feeling \"heavy\". She states when she coughs or anything that her bladder loosens a little bit. The patient denies having to wear a pad. She is still utilizing the oxybutynin and take 1 at night. The patient reports experiencing the \"heavy\" feeling often during the day. She notes that she experience problems with constipation. 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 The patient states that she is chronically dehydrated.    Objective   Vital Signs:   Ht 167.6 cm (66\")   Wt 86.4 kg (190 lb 6.4 oz)   BMI 30.73 kg/m²       Physical Exam  Vitals and nursing note reviewed.   Constitutional:       Appearance: Normal appearance. She is well-developed.   Pulmonary:      Effort: Pulmonary effort is normal.      Breath sounds: Normal air entry.   Neurological:      Mental Status: She is alert and oriented to person, place, and time.      Motor: Motor function is intact.   Psychiatric:         Mood and Affect: Mood normal.         Behavior: Behavior normal.          Result Review :   The following data was reviewed by: Loretta Leal MD on 11/18/2022:    Results for orders placed or performed in visit on 11/18/22   POC Urinalysis Dipstick, Automated    Specimen: Urine   Result Value Ref Range    Color Yellow Yellow, Straw, Dark Yellow, Linda    Clarity, UA Clear Clear    Specific Gravity  1.030 1.005 - 1.030    pH, Urine 5.5 5.0 - 8.0    Leukocytes Negative Negative    Nitrite, UA Negative Negative    Protein, POC Negative Negative mg/dL    Glucose, UA Negative Negative mg/dL    Ketones, UA Negative Negative    Urobilinogen, UA 0.2 E.U./dL Normal, 0.2 E.U./dL    Bilirubin Negative Negative    Blood, UA Negative Negative    Lot Number [id number]     Expiration Date 112,023       "     Data reviewed: Radiologic studies KUB:   Results    Procedure Component Value Ref Range Date/Time   XR Abdomen KUB [[id number]] Collected: 09/02/22 2128   Order Status: Completed Updated: 09/02/22 2131   Narrative:     PROCEDURE: XR ABDOMEN KUB  (TWO AP SUPINE VIEWS)       COMPARISON: 3/26/2021.       INDICATIONS: HX OF KIDNEY STONES; BILATERAL FLANK PAIN & NAUSEA, STARTING 1-2 WEEKS AGO.       FINDINGS:   BOWEL GAS PATTERN: Normal.  No abnormal dilation or deviation.     CALCIFICATIONS: None significant.  No definite nephrolithiasis is seen.  No definite   ureterolithiasis is appreciated.  There are suspected incidental pelvic phleboliths.   OTHER: Negative.  No abnormal gaseous collections.  The patient has undergone cholecystectomy.       Impression:     The bowel gas pattern is nonobstructive.  No definite nephrolithiasis or   ureterolithiasis is appreciated radiographically.                 COMMENT:  Part of this note is an electronic transcription of spoken language to printed text. The   electronic translation/transcription may permit erroneous, or at times, nonsensical (or even   sensical) words or phrases to be inadvertently transcribed or omitted; this  has   reviewed the note for such errors (as well as additional errors); however, some may still exist.       CHACHA NJ JR, MD         Electronically Signed and Approved By: CHACHA NJ JR, MD on 9/02/2022 at 21:27               Assessment and Plan    Diagnoses and all orders for this visit:    1. OAB (overactive bladder) (Primary)  Assessment & Plan:  - I switched her to oxybutynin ER 5 mg once a day and we will see how that works.    Orders:  -     POC Urinalysis Dipstick, Automated  -     oxybutynin XL (DITROPAN-XL) 5 MG 24 hr tablet; Take 1 tablet by mouth Daily.  Dispense: 30 tablet; Refill: 11    2. Renal stones  -     XR Abdomen KUB; Future          Follow Up       No follow-ups on file.  Patient was given instructions and  counseling regarding her condition or for health maintenance advice. Please see specific information pulled into the AVS if appropriate.     Transcribed from ambient dictation for Loretta Leal MD by Brianne Ag.  11/18/22   13:49 EST    Patient or patient representative verbalized consent to the visit recording.  I have personally performed the services described in this document as transcribed by the above individual, and it is both accurate and complete.  Loretta Leal MD  11/20/2022  14:26 EST

## 2023-02-13 ENCOUNTER — OFFICE VISIT (OUTPATIENT)
Dept: CARDIOLOGY | Facility: CLINIC | Age: 53
End: 2023-02-13
Payer: COMMERCIAL

## 2023-02-13 VITALS
HEIGHT: 66 IN | DIASTOLIC BLOOD PRESSURE: 77 MMHG | HEART RATE: 65 BPM | WEIGHT: 188 LBS | BODY MASS INDEX: 30.22 KG/M2 | SYSTOLIC BLOOD PRESSURE: 127 MMHG

## 2023-02-13 DIAGNOSIS — I25.10 NONOBSTRUCTIVE ATHEROSCLEROSIS OF CORONARY ARTERY: Primary | ICD-10-CM

## 2023-02-13 DIAGNOSIS — I73.9 CLAUDICATION: ICD-10-CM

## 2023-02-13 DIAGNOSIS — I10 ESSENTIAL HYPERTENSION: ICD-10-CM

## 2023-02-13 PROCEDURE — 99214 OFFICE O/P EST MOD 30 MIN: CPT | Performed by: INTERNAL MEDICINE

## 2023-02-13 RX ORDER — LOSARTAN POTASSIUM 50 MG/1
75 TABLET ORAL DAILY
Qty: 135 TABLET | Refills: 2 | Status: SHIPPED | OUTPATIENT
Start: 2023-02-13

## 2023-02-19 NOTE — ASSESSMENT & PLAN NOTE
Because of lower extremity symptoms or history of claudication, we will proceed with bilateral ankle Doppler brachial index to rule out arterial insufficiency.

## 2023-02-19 NOTE — PROGRESS NOTES
CARDIOLOGY FOLLOW-UP PROGRESS NOTE        Chief Complaint  Follow-up, Hypertension, and Coronary Artery Disease    Subjective            Mariam Rincon presents to [facility] CARDIOLOGY  History of Present Illness    Ms Rincon is here for routine 6-month follow-up visit.  She continues to report exertional shortness of breath on moderate exertion.  Other symptoms include mild chest discomfort.  His symptoms are persisting and grossly unchanged from before.  Her other symptom is pain and numbness of the lower extremities at rest and also with walking.  Blood pressure is very well controlled on current regimen.  No recent hospitalizations or ER visits.  She is taking all the medications as prescribed.      Past History:    1) Nonobstructive coronary artery disease. Cardiac catheterization done on 2019 showed 30-40% stenosis of the mid LAD; 2) Left bundle branch block; 3) Hypertension; 4) Hyperlipidemia.     Medical History:  Past Medical History:   Diagnosis Date   • Arthritis    • Coronary artery disease    • Fibromyalgia    • History of transfusion    • Hyperlipidemia    • Hypertension    • Stomach ulcer        Surgical History: has a past surgical history that includes  section; Cholecystectomy; Hysterectomy;  section; and Tubal ligation.     Family History: family history includes Aneurysm in her brother; Breast cancer in her paternal aunt and paternal aunt; Colon cancer (age of onset: 70) in her father; Heart disease in her father and mother; Stroke in her sister.     Social History: reports that she has never smoked. She has never used smokeless tobacco. She reports that she does not drink alcohol and does not use drugs.    Allergies: Alpha-gal and Antihistamines, diphenhydramine-type    Current Outpatient Medications on File Prior to Visit   Medication Sig   • aspirin 81 MG EC tablet Take 1 tablet by mouth Daily.   • carvedilol (COREG) 6.25 MG tablet Take 6.25 mg by  "mouth 2 (Two) Times a Day With Meals.   • DULoxetine (CYMBALTA) 30 MG capsule Take 30 mg by mouth Daily.   • estradiol (ESTRACE) 1 MG tablet Take 1 tablet by mouth Daily.   • famotidine (PEPCID) 40 MG tablet Take 40 mg by mouth Daily.   • isosorbide mononitrate (IMDUR) 30 MG 24 hr tablet Take 30 mg by mouth Daily.   • nitroglycerin (NITROSTAT) 0.4 MG SL tablet Place 0.4 mg under the tongue Every 5 (Five) Minutes As Needed for Chest Pain. Take no more than 3 doses in 15 minutes.   • oxybutynin XL (DITROPAN-XL) 5 MG 24 hr tablet Take 1 tablet by mouth Daily.   • pantoprazole (PROTONIX) 40 MG EC tablet Take 40 mg by mouth Daily.   • prednisoLONE acetate (PRED FORTE) 1 % ophthalmic suspension INSTILL 1 DROP INTO BOTH EYES TWICE A DAY FOR 14 DAYS   • rosuvastatin (CRESTOR) 5 MG tablet Take 5 mg by mouth Daily.   • traZODone (DESYREL) 50 MG tablet Take 50 mg by mouth Every Night.     No current facility-administered medications on file prior to visit.          Review of Systems   Respiratory: Positive for shortness of breath. Negative for cough and wheezing.    Cardiovascular: Positive for chest pain. Negative for palpitations and leg swelling.   Gastrointestinal: Negative for nausea and vomiting.   Neurological: Negative for dizziness and syncope.        Objective     /77   Pulse 65   Ht 167.6 cm (66\")   Wt 85.3 kg (188 lb)   BMI 30.34 kg/m²       Physical Exam    General : Alert, awake, no acute distress  Neck : Supple, no carotid bruit, no jugular venous distention  CVS : Regular rate and rhythm, no murmur, rubs or gallops  Lungs: Clear to auscultation bilaterally, no crackles or rhonchi  Abdomen: Soft, nontender, bowel sounds heard in all 4 quadrants  Extremities: Warm, well-perfused, no pedal edema    Result Review :     The following data was reviewed by: Brendon Coy MD on 02/13/2023:                         Data reviewed: Cardiology studies      Echocardiogram done on 6/29/2018 showed    1. Normal " left ventricular systolic function with an estimated ejection fraction of 55-60%.   2. Mild asymmetric septal hypertrophy of the left ventricle with grade 1 diastolic dysfunction.   3. Mild mitral regurgitation.   4.  Mild aortic insufficiency.  5.  Mild-to-moderate tricuspid regurgitation with borderline elevated pulmonary artery systolic pressure.                      Assessment and Plan        Diagnoses and all orders for this visit:    1. Nonobstructive atherosclerosis of coronary artery (Primary)  Assessment & Plan:  She continues to report intermittent chest discomfort which are atypical for angina.  Previous to Cardiac catheterization showed 40% lesion of the mid LAD which was not hemodynamically significant.  Other symptoms include shortness of breath with activity.  We will hold off on doing further ischemic evaluation at this time.  We will repeat an echocardiogram to reevaluate LV systolic function.  Continue aspirin, beta-blocker, statin and long-acting nitrates    Orders:  -     Adult Transthoracic Echo Complete W/ Cont if Necessary Per Protocol; Future    2. Essential hypertension  Assessment & Plan:  Blood pressure very well controlled with current regimen.  Continue carvedilol and losartan at the current dose.    Orders:  -     losartan (COZAAR) 50 MG tablet; Take 1.5 tablets by mouth Daily.  Dispense: 135 tablet; Refill: 2    3. Claudication (HCC)  Assessment & Plan:  Because of lower extremity symptoms or history of claudication, we will proceed with bilateral ankle Doppler brachial index to rule out arterial insufficiency.    Orders:  -     Doppler Arterial Multi Level Lower Extremity - Bilateral CAR; Future            Follow Up     Return in about 6 months (around 8/13/2023) for Next scheduled follow up.    Patient was given instructions and counseling regarding her condition or for health maintenance advice. Please see specific information pulled into the AVS if appropriate.

## 2023-02-19 NOTE — ASSESSMENT & PLAN NOTE
She continues to report intermittent chest discomfort which are atypical for angina.  Previous to Cardiac catheterization showed 40% lesion of the mid LAD which was not hemodynamically significant.  Other symptoms include shortness of breath with activity.  We will hold off on doing further ischemic evaluation at this time.  We will repeat an echocardiogram to reevaluate LV systolic function.  Continue aspirin, beta-blocker, statin and long-acting nitrates

## 2023-02-19 NOTE — ASSESSMENT & PLAN NOTE
Blood pressure very well controlled with current regimen.  Continue carvedilol and losartan at the current dose.

## 2023-04-12 ENCOUNTER — TRANSCRIBE ORDERS (OUTPATIENT)
Dept: ADMINISTRATIVE | Facility: HOSPITAL | Age: 53
End: 2023-04-12
Payer: COMMERCIAL

## 2023-04-12 DIAGNOSIS — Z12.31 SCREENING MAMMOGRAM, ENCOUNTER FOR: Primary | ICD-10-CM

## 2023-05-25 ENCOUNTER — HOSPITAL ENCOUNTER (OUTPATIENT)
Dept: CARDIOLOGY | Facility: HOSPITAL | Age: 53
Discharge: HOME | End: 2023-05-25
Admitting: INTERNAL MEDICINE
Payer: COMMERCIAL

## 2023-05-25 DIAGNOSIS — I25.10 NONOBSTRUCTIVE ATHEROSCLEROSIS OF CORONARY ARTERY: ICD-10-CM

## 2023-05-25 PROCEDURE — 93306 TTE W/DOPPLER COMPLETE: CPT | Performed by: INTERNAL MEDICINE

## 2023-05-25 PROCEDURE — 93306 TTE W/DOPPLER COMPLETE: CPT

## 2023-05-26 LAB
BH CV ECHO MEAS - AO ROOT DIAM: 3.4 CM
BH CV ECHO MEAS - EDV(CUBED): 81.7 ML
BH CV ECHO MEAS - EDV(MOD-SP2): 80.4 ML
BH CV ECHO MEAS - EDV(MOD-SP4): 68.6 ML
BH CV ECHO MEAS - EF(MOD-BP): 55.6 %
BH CV ECHO MEAS - EF(MOD-SP2): 48.5 %
BH CV ECHO MEAS - EF(MOD-SP4): 60.6 %
BH CV ECHO MEAS - ESV(CUBED): 13.3 ML
BH CV ECHO MEAS - ESV(MOD-SP2): 41.4 ML
BH CV ECHO MEAS - ESV(MOD-SP4): 27 ML
BH CV ECHO MEAS - FS: 45.4 %
BH CV ECHO MEAS - IVS/LVPW: 1.05 CM
BH CV ECHO MEAS - IVSD: 1.39 CM
BH CV ECHO MEAS - LA DIMENSION: 3.8 CM
BH CV ECHO MEAS - LAT PEAK E' VEL: 7 CM/SEC
BH CV ECHO MEAS - LV MASS(C)D: 224.1 GRAMS
BH CV ECHO MEAS - LVIDD: 4.3 CM
BH CV ECHO MEAS - LVIDS: 2.37 CM
BH CV ECHO MEAS - LVPWD: 1.32 CM
BH CV ECHO MEAS - MED PEAK E' VEL: 5.9 CM/SEC
BH CV ECHO MEAS - MV A MAX VEL: 69.8 CM/SEC
BH CV ECHO MEAS - MV DEC SLOPE: 196 CM/SEC2
BH CV ECHO MEAS - MV DEC TIME: 0.25 MSEC
BH CV ECHO MEAS - MV E MAX VEL: 48.8 CM/SEC
BH CV ECHO MEAS - MV E/A: 0.7
BH CV ECHO MEAS - RAP SYSTOLE: 3 MMHG
BH CV ECHO MEAS - RVDD: 2.32 CM
BH CV ECHO MEAS - RVSP: 30.2 MMHG
BH CV ECHO MEAS - SV(MOD-SP2): 39 ML
BH CV ECHO MEAS - SV(MOD-SP4): 41.6 ML
BH CV ECHO MEAS - TR MAX PG: 27.2 MMHG
BH CV ECHO MEAS - TR MAX VEL: 261 CM/SEC
BH CV ECHO MEASUREMENTS AVERAGE E/E' RATIO: 7.57
IVRT: 69 MSEC
MAXIMAL PREDICTED HEART RATE: 169 BPM
STRESS TARGET HR: 144 BPM

## 2023-08-15 DIAGNOSIS — Z79.890 HORMONE REPLACEMENT THERAPY (HRT): ICD-10-CM

## 2023-08-15 RX ORDER — ESTRADIOL 1 MG/1
TABLET ORAL
Qty: 90 TABLET | Refills: 0 | Status: SHIPPED | OUTPATIENT
Start: 2023-08-15

## 2023-09-08 ENCOUNTER — OFFICE VISIT (OUTPATIENT)
Dept: CARDIOLOGY | Facility: CLINIC | Age: 53
End: 2023-09-08
Payer: COMMERCIAL

## 2023-09-08 VITALS
DIASTOLIC BLOOD PRESSURE: 73 MMHG | BODY MASS INDEX: 31.34 KG/M2 | SYSTOLIC BLOOD PRESSURE: 117 MMHG | WEIGHT: 195 LBS | HEIGHT: 66 IN | HEART RATE: 79 BPM

## 2023-09-08 DIAGNOSIS — I25.10 NONOBSTRUCTIVE ATHEROSCLEROSIS OF CORONARY ARTERY: Primary | ICD-10-CM

## 2023-09-08 DIAGNOSIS — E78.2 MIXED HYPERLIPIDEMIA: ICD-10-CM

## 2023-09-08 DIAGNOSIS — I10 ESSENTIAL HYPERTENSION: ICD-10-CM

## 2023-09-08 DIAGNOSIS — I73.9 CLAUDICATION: ICD-10-CM

## 2023-09-08 PROCEDURE — 99214 OFFICE O/P EST MOD 30 MIN: CPT | Performed by: FAMILY MEDICINE

## 2023-09-08 RX ORDER — NITROGLYCERIN 0.4 MG/1
0.4 TABLET SUBLINGUAL
Qty: 25 TABLET | Refills: 2 | Status: SHIPPED | OUTPATIENT
Start: 2023-09-08

## 2023-09-08 NOTE — PROGRESS NOTES
Chief Complaint  Coronary Artery Disease, Hypertension, and Claudication    Subjective        History of Present Illness  Mariam Rincon presents to [facility] CARDIOLOGY   Ms. Rincon is a 53-year-old female patient coming in for routine cardiac follow-up.  She continues to have some intermittent episodes of mild chest discomfort/pain, as well as exertional shortness of breath .  At previous visit she also noted complaints of pain and numbness in the lower extremities worsened with walking.  She has not completed Doppler studies as, she wanted to see what her cardiogram showed before moving forward with this.        Past History:     1) Nonobstructive coronary artery disease. Cardiac catheterization done on 2019 showed 30-40% stenosis of the mid LAD; 2) Left bundle branch block; 3) Hypertension; 4) Hyperlipidemia.     Past Medical History:   Diagnosis Date    Arthritis     Coronary artery disease     Fibromyalgia     History of transfusion     Hyperlipidemia     Hypertension     Stomach ulcer        Allergies   Allergen Reactions    Alpha-Gal Swelling    Antihistamines, Diphenhydramine-Type Rash        Past Surgical History:   Procedure Laterality Date     SECTION       SECTION      CHOLECYSTECTOMY      HYSTERECTOMY      Supracervical, adhesions and pain    TUBAL ABDOMINAL LIGATION          Social History  She  reports that she has never smoked. She has never used smokeless tobacco. She reports that she does not drink alcohol and does not use drugs.    Family History  Her family history includes Aneurysm in her brother; Breast cancer in her paternal aunt and paternal aunt; Colon cancer (age of onset: 70) in her father; Heart disease in her father and mother; Stroke in her sister.       Current Outpatient Medications on File Prior to Visit   Medication Sig    aspirin 81 MG EC tablet Take 1 tablet by mouth Daily.    carvedilol (COREG) 6.25 MG tablet Take 1 tablet by mouth 2  "(Two) Times a Day With Meals.    DULoxetine (CYMBALTA) 30 MG capsule Take 1 capsule by mouth Daily.    estradiol (ESTRACE) 1 MG tablet TAKE 1 TABLET BY MOUTH EVERY DAY    famotidine (PEPCID) 40 MG tablet Take 1 tablet by mouth Daily.    isosorbide mononitrate (IMDUR) 30 MG 24 hr tablet Take 1 tablet by mouth Daily.    losartan (COZAAR) 50 MG tablet Take 1.5 tablets by mouth Daily.    oxybutynin XL (DITROPAN-XL) 5 MG 24 hr tablet Take 1 tablet by mouth Daily.    pantoprazole (PROTONIX) 40 MG EC tablet Take 1 tablet by mouth Daily.    prednisoLONE acetate (PRED FORTE) 1 % ophthalmic suspension INSTILL 1 DROP INTO BOTH EYES TWICE A DAY FOR 14 DAYS    rosuvastatin (CRESTOR) 5 MG tablet Take 1 tablet by mouth Daily.    traZODone (DESYREL) 50 MG tablet Take 1 tablet by mouth Every Night.    vitamin D3 125 MCG (5000 UT) capsule capsule 1,000 Units Daily. With Vit K     No current facility-administered medications on file prior to visit.         Review of Systems     Respiratory: Positive for shortness of breath. Negative for cough and wheezing.    Cardiovascular: Positive for chest pain. Negative for palpitations and leg swelling.  Gastrointestinal: Negative for nausea and vomiting.  Neurological: Negative for dizziness and syncope.     Objective   Vitals:    09/08/23 1502   BP: 117/73   Pulse: 79   Weight: 88.5 kg (195 lb)   Height: 167.6 cm (66\")         Physical Exam  General : Alert, awake, no acute distress  Neck : Supple, no carotid bruit, no jugular venous distention  CVS : Regular rate and rhythm, no murmur, rubs or gallops  Lungs: Clear to auscultation bilaterally, no crackles or rhonchi  Abdomen: Soft, nontender, bowel sounds active  Extremities: Warm, well-perfused, no pedal edema      Result Review     The following data was reviewed by MARLO Harry  proBNP   Date Value Ref Range Status   11/19/2016 67.7 5.0 - 450.0 pg/mL Final          Lab Results   Component Value Date    TSH 1.500 11/20/2016    "     Lab Results   Component Value Date    TROPONINT <0.010 2016           Scanned labs from [facility] dated 2022 were reviewed.    Results for orders placed during the hospital encounter of 23    Adult Transthoracic Echo Complete W/ Cont if Necessary Per Protocol    Interpretation Summary    Left ventricular ejection fraction appears to be 56 - 60%.    Left ventricular wall thickness is consistent with mild concentric hypertrophy.    Left ventricular diastolic function was normal.    Estimated right ventricular systolic pressure from tricuspid regurgitation is normal (<35 mmHg).    Trivial to mild tricuspid regurgitation         Assessment and Plan   Diagnoses and all orders for this visit:    1. Nonobstructive atherosclerosis of coronary artery (Primary)  Assessment & Plan:  She is stable.  She does have some intermittent episodes of chest discomfort, and occasional shortness of breath.  We also discussed the possibility of underlying respiratory disease such as asthma contributing to her shortness of breath.  She had previous cardiac catheterization showing 40% lesion of the mid LAD, which was not hemodynamically significant.  Her echocardiogram shows normal LV systolic function.  Continue aspirin, beta-blocker, statin and long-acting nitrates.  She has not had any recent use of sublingual nitroglycerin, but current prescription is  and would like to update.  Refill sent.      2. Essential hypertension  Assessment & Plan:  Pressures well controlled, continue current doses of losartan and carvedilol.      3. Claudication  Assessment & Plan:  She is having some symptoms of lower extremity pain, she previously had bilateral ankle Doppler brachial index ordered to rule out arterial insufficiency, encouraged her to go ahead and reschedule testing for evaluation.      4. Mixed hyperlipidemia  Assessment & Plan:  Most recent LDL is 55, which is below goal.  Continue current  dose rosuvastatin.  We will send for updated lipid profile from PCP.      Other orders  -     nitroglycerin (NITROSTAT) 0.4 MG SL tablet; Place 1 tablet under the tongue Every 5 (Five) Minutes As Needed for Chest Pain. Take no more than 3 doses in 15 minutes.  Dispense: 25 tablet; Refill: 2        Follow Up   Return for Next scheduled follow up, with Dr. Coy 8-9 mos.    Patient was given instructions and counseling regarding her condition or for health maintenance advice. Please see specific information pulled into the AVS if appropriate.     Signed,  Aruna Mullen, MARLO  09/08/2023     Dictated Utilizing Dragon Dictation: Please note that portions of this note were completed with a voice recognition program.  Part of this note may be an electronic transcription/translation of spoken language to printed text using the Dragon Dictation System.

## 2023-09-08 NOTE — LETTER
2023     Luis Alcala MD  [address] [place] [address]    Patient: Mariam Rincon   YOB: 1970   Date of Visit: 2023       Dear Luis Alcala MD    Mariam Rincon was in my office today. Below is a copy of my note.    If you have questions, please do not hesitate to call me. I look forward to following Mariam along with you.         Sincerely,        Aruna Mullen, MARLO        CC: No Recipients    Chief Complaint  Coronary Artery Disease, Hypertension, and Claudication    Subjective        History of Present Illness  Mariam Rincon presents to [facility] CARDIOLOGY   Ms. Rincon is a 53-year-old female patient coming in for routine cardiac follow-up.  She continues to have some intermittent episodes of mild chest discomfort/pain, as well as exertional shortness of breath .  At previous visit she also noted complaints of pain and numbness in the lower extremities worsened with walking.  She has not completed Doppler studies as, she wanted to see what her cardiogram showed before moving forward with this.        Past History:     1) Nonobstructive coronary artery disease. Cardiac catheterization done on 2019 showed 30-40% stenosis of the mid LAD; 2) Left bundle branch block; 3) Hypertension; 4) Hyperlipidemia.     Past Medical History:   Diagnosis Date   • Arthritis    • Coronary artery disease    • Fibromyalgia    • History of transfusion    • Hyperlipidemia    • Hypertension    • Stomach ulcer        Allergies   Allergen Reactions   • Alpha-Gal Swelling   • Antihistamines, Diphenhydramine-Type Rash        Past Surgical History:   Procedure Laterality Date   •  SECTION     •  SECTION     • CHOLECYSTECTOMY     • HYSTERECTOMY      Supracervical, adhesions and pain   • TUBAL ABDOMINAL LIGATION          Social History  She  reports that she has never smoked. She has never used smokeless tobacco. She reports that she does not  "drink alcohol and does not use drugs.    Family History  Her family history includes Aneurysm in her brother; Breast cancer in her paternal aunt and paternal aunt; Colon cancer (age of onset: 70) in her father; Heart disease in her father and mother; Stroke in her sister.       Current Outpatient Medications on File Prior to Visit   Medication Sig   • aspirin 81 MG EC tablet Take 1 tablet by mouth Daily.   • carvedilol (COREG) 6.25 MG tablet Take 1 tablet by mouth 2 (Two) Times a Day With Meals.   • DULoxetine (CYMBALTA) 30 MG capsule Take 1 capsule by mouth Daily.   • estradiol (ESTRACE) 1 MG tablet TAKE 1 TABLET BY MOUTH EVERY DAY   • famotidine (PEPCID) 40 MG tablet Take 1 tablet by mouth Daily.   • isosorbide mononitrate (IMDUR) 30 MG 24 hr tablet Take 1 tablet by mouth Daily.   • losartan (COZAAR) 50 MG tablet Take 1.5 tablets by mouth Daily.   • oxybutynin XL (DITROPAN-XL) 5 MG 24 hr tablet Take 1 tablet by mouth Daily.   • pantoprazole (PROTONIX) 40 MG EC tablet Take 1 tablet by mouth Daily.   • prednisoLONE acetate (PRED FORTE) 1 % ophthalmic suspension INSTILL 1 DROP INTO BOTH EYES TWICE A DAY FOR 14 DAYS   • rosuvastatin (CRESTOR) 5 MG tablet Take 1 tablet by mouth Daily.   • traZODone (DESYREL) 50 MG tablet Take 1 tablet by mouth Every Night.   • vitamin D3 125 MCG (5000 UT) capsule capsule 1,000 Units Daily. With Vit K     No current facility-administered medications on file prior to visit.         Review of Systems     Respiratory: Positive for shortness of breath. Negative for cough and wheezing.    Cardiovascular: Positive for chest pain. Negative for palpitations and leg swelling.  Gastrointestinal: Negative for nausea and vomiting.  Neurological: Negative for dizziness and syncope.     Objective   Vitals:    09/08/23 1502   BP: 117/73   Pulse: 79   Weight: 88.5 kg (195 lb)   Height: 167.6 cm (66\")         Physical Exam  General : Alert, awake, no acute distress  Neck : Supple, no carotid bruit, no " jugular venous distention  CVS : Regular rate and rhythm, no murmur, rubs or gallops  Lungs: Clear to auscultation bilaterally, no crackles or rhonchi  Abdomen: Soft, nontender, bowel sounds active  Extremities: Warm, well-perfused, no pedal edema      Result Review     The following data was reviewed by MARLO Harry  proBNP   Date Value Ref Range Status   2016 67.7 5.0 - 450.0 pg/mL Final          Lab Results   Component Value Date    TSH 1.500 2016        Lab Results   Component Value Date    TROPONINT <0.010 2016           Scanned labs from [facility] dated 2022 were reviewed.    Results for orders placed during the hospital encounter of 23    Adult Transthoracic Echo Complete W/ Cont if Necessary Per Protocol    Interpretation Summary  •  Left ventricular ejection fraction appears to be 56 - 60%.  •  Left ventricular wall thickness is consistent with mild concentric hypertrophy.  •  Left ventricular diastolic function was normal.  •  Estimated right ventricular systolic pressure from tricuspid regurgitation is normal (<35 mmHg).  •  Trivial to mild tricuspid regurgitation         Assessment and Plan   Diagnoses and all orders for this visit:    1. Nonobstructive atherosclerosis of coronary artery (Primary)  Assessment & Plan:  She is stable.  She does have some intermittent episodes of chest discomfort, and occasional shortness of breath.  We also discussed the possibility of underlying respiratory disease such as asthma contributing to her shortness of breath.  She had previous cardiac catheterization showing 40% lesion of the mid LAD, which was not hemodynamically significant.  Her echocardiogram shows normal LV systolic function.  Continue aspirin, beta-blocker, statin and long-acting nitrates.  She has not had any recent use of sublingual nitroglycerin, but current prescription is  and would like to update.  Refill sent.      2. Essential  hypertension  Assessment & Plan:  Pressures well controlled, continue current doses of losartan and carvedilol.      3. Claudication  Assessment & Plan:  She is having some symptoms of lower extremity pain, she previously had bilateral ankle Doppler brachial index ordered to rule out arterial insufficiency, encouraged her to go ahead and reschedule testing for evaluation.      4. Mixed hyperlipidemia  Assessment & Plan:  Most recent LDL is 55, which is below goal.  Continue current dose rosuvastatin.  We will send for updated lipid profile from PCP.      Other orders  -     nitroglycerin (NITROSTAT) 0.4 MG SL tablet; Place 1 tablet under the tongue Every 5 (Five) Minutes As Needed for Chest Pain. Take no more than 3 doses in 15 minutes.  Dispense: 25 tablet; Refill: 2        Follow Up   Return for Next scheduled follow up, with Dr. Coy 8-9 mos.    Patient was given instructions and counseling regarding her condition or for health maintenance advice. Please see specific information pulled into the AVS if appropriate.     Signed,  Aruna Mullen, APRN  09/08/2023     Dictated Utilizing Dragon Dictation: Please note that portions of this note were completed with a voice recognition program.  Part of this note may be an electronic transcription/translation of spoken language to printed text using the Dragon Dictation System.

## 2023-09-11 NOTE — ASSESSMENT & PLAN NOTE
She is stable.  She does have some intermittent episodes of chest discomfort, and occasional shortness of breath.  We also discussed the possibility of underlying respiratory disease such as asthma contributing to her shortness of breath.  She had previous cardiac catheterization showing 40% lesion of the mid LAD, which was not hemodynamically significant.  Her echocardiogram shows normal LV systolic function.  Continue aspirin, beta-blocker, statin and long-acting nitrates.  She has not had any recent use of sublingual nitroglycerin, but current prescription is  and would like to update.  Refill sent.

## 2023-09-11 NOTE — ASSESSMENT & PLAN NOTE
She is having some symptoms of lower extremity pain, she previously had bilateral ankle Doppler brachial index ordered to rule out arterial insufficiency, encouraged her to go ahead and reschedule testing for evaluation.

## 2023-09-11 NOTE — ASSESSMENT & PLAN NOTE
Most recent LDL is 55, which is below goal.  Continue current dose rosuvastatin.  We will send for updated lipid profile from PCP.   Pt presents to ED c/o toe pain. States "for one month I have this irritation on all of my toes on both feet. The skin is red and I have a blister on my right 5th toe". Denies itching, pain, fever, drainage.

## 2023-09-25 DIAGNOSIS — N32.81 OAB (OVERACTIVE BLADDER): ICD-10-CM

## 2023-09-25 RX ORDER — OXYBUTYNIN CHLORIDE 5 MG/1
5 TABLET, EXTENDED RELEASE ORAL DAILY
Qty: 30 TABLET | Refills: 2 | Status: SHIPPED | OUTPATIENT
Start: 2023-09-25

## 2023-10-26 ENCOUNTER — TRANSCRIBE ORDERS (OUTPATIENT)
Dept: ADMINISTRATIVE | Facility: HOSPITAL | Age: 53
End: 2023-10-26
Payer: COMMERCIAL

## 2023-10-26 ENCOUNTER — HOSPITAL ENCOUNTER (OUTPATIENT)
Dept: GENERAL RADIOLOGY | Facility: HOSPITAL | Age: 53
Discharge: HOME | End: 2023-10-26
Payer: COMMERCIAL

## 2023-10-26 DIAGNOSIS — R06.09 DOE (DYSPNEA ON EXERTION): ICD-10-CM

## 2023-10-26 DIAGNOSIS — M79.622 PAIN IN BOTH UPPER ARMS: ICD-10-CM

## 2023-10-26 DIAGNOSIS — R06.09 DOE (DYSPNEA ON EXERTION): Primary | ICD-10-CM

## 2023-10-26 DIAGNOSIS — M79.621 PAIN IN BOTH UPPER ARMS: ICD-10-CM

## 2023-11-01 ENCOUNTER — TELEPHONE (OUTPATIENT)
Dept: CARDIOLOGY | Facility: CLINIC | Age: 53
End: 2023-11-01
Payer: COMMERCIAL

## 2023-11-01 NOTE — TELEPHONE ENCOUNTER
ROMANA patient. Went over results with patient. Patient requests to come in for f/u. Was able to schedule with MARLO Vick

## 2023-11-01 NOTE — TELEPHONE ENCOUNTER
Please let patient know her PCP sent copy of recent Holter monitor study.  Overall it appears she is in a sinus rhythm which is the normal rhythm of the heart, with occasional early beats from the upper and lower portion of the heart.  Overall this is a fairly benign study, if she is still having symptoms or wishes to discuss in more detail we can schedule her in the office.

## 2023-11-08 DIAGNOSIS — Z79.890 HORMONE REPLACEMENT THERAPY (HRT): ICD-10-CM

## 2023-11-08 RX ORDER — ESTRADIOL 1 MG/1
TABLET ORAL
Qty: 90 TABLET | Refills: 0 | Status: SHIPPED | OUTPATIENT
Start: 2023-11-08

## 2023-11-08 NOTE — TELEPHONE ENCOUNTER
Okkwame'd refill on Estrace x 1 #90.  Last seen 5/31/22.  No appointment scheduled.  Needs to schedule appointment for any further refills

## 2023-11-09 ENCOUNTER — OFFICE VISIT (OUTPATIENT)
Dept: CARDIOLOGY | Facility: CLINIC | Age: 53
End: 2023-11-09
Payer: COMMERCIAL

## 2023-11-09 VITALS
SYSTOLIC BLOOD PRESSURE: 144 MMHG | DIASTOLIC BLOOD PRESSURE: 88 MMHG | HEIGHT: 66 IN | HEART RATE: 70 BPM | WEIGHT: 195 LBS | BODY MASS INDEX: 31.34 KG/M2

## 2023-11-09 DIAGNOSIS — R06.09 EXERTIONAL DYSPNEA: ICD-10-CM

## 2023-11-09 DIAGNOSIS — I25.10 NONOBSTRUCTIVE ATHEROSCLEROSIS OF CORONARY ARTERY: ICD-10-CM

## 2023-11-09 DIAGNOSIS — I10 ESSENTIAL HYPERTENSION: ICD-10-CM

## 2023-11-09 DIAGNOSIS — R00.2 PALPITATIONS: Primary | ICD-10-CM

## 2023-11-09 NOTE — PROGRESS NOTES
Chief Complaint  Follow-up (Holter results )    Subjective        History of Present Illness  Mariam Rincon presents to [facility] CARDIOLOGY   Ms. Rincon is a 53-year-old female patient coming in today for follow-up of complaints of chest pain shortness of breath, palpitations, and to discuss results of recent Holter study.  She had seen her PCP with complaints of palpitations, and underwent Holter monitor study.  There was 1 brief episode lasting approximately 7 beats of atrial tachycardia otherwise fairly unremarkable Holter study.  She describes continuing to have intermittent episodes of chest pain and what she describes palpitation, she also states she is very concerned because she is having continued worsening exertional shortness states simple activities such as walking outside to her vehicle leaves her feeling very winded and take a few minutes to recover.       Past History:     1) Nonobstructive coronary artery disease. Cardiac catheterization done on 2019 showed 30-40% stenosis of the mid LAD; 2) Left bundle branch block; 3) Hypertension; 4) Hyperlipidemia.     Past Medical History:   Diagnosis Date    Arthritis     Coronary artery disease     Fibromyalgia     History of transfusion     Hyperlipidemia     Hypertension     Stomach ulcer        Allergies   Allergen Reactions    Alpha-Gal Swelling    Antihistamines, Diphenhydramine-Type Rash        Past Surgical History:   Procedure Laterality Date     SECTION       SECTION      CHOLECYSTECTOMY      HYSTERECTOMY      Supracervical, adhesions and pain    TUBAL ABDOMINAL LIGATION          Social History  She  reports that she has never smoked. She has never used smokeless tobacco. She reports that she does not drink alcohol and does not use drugs.    Family History  Her family history includes Aneurysm in her brother; Breast cancer in her paternal aunt and paternal aunt; Colon cancer (age of onset: 70) in her  "father; Heart disease in her father and mother; Stroke in her sister.       Current Outpatient Medications on File Prior to Visit   Medication Sig    aspirin 81 MG EC tablet Take 1 tablet by mouth Daily.    carvedilol (COREG) 6.25 MG tablet Take 1 tablet by mouth 2 (Two) Times a Day With Meals.    DULoxetine (CYMBALTA) 30 MG capsule Take 1 capsule by mouth Daily.    estradiol (ESTRACE) 1 MG tablet TAKE 1 TABLET BY MOUTH EVERY DAY    famotidine (PEPCID) 40 MG tablet Take 1 tablet by mouth Daily.    isosorbide mononitrate (IMDUR) 30 MG 24 hr tablet Take 1 tablet by mouth Daily.    losartan (COZAAR) 50 MG tablet Take 1.5 tablets by mouth Daily.    nitroglycerin (NITROSTAT) 0.4 MG SL tablet Place 1 tablet under the tongue Every 5 (Five) Minutes As Needed for Chest Pain. Take no more than 3 doses in 15 minutes.    oxybutynin XL (DITROPAN-XL) 5 MG 24 hr tablet Take 1 tablet by mouth Daily. Needs annual appt for additional refills    pantoprazole (PROTONIX) 40 MG EC tablet Take 1 tablet by mouth Daily.    prednisoLONE acetate (PRED FORTE) 1 % ophthalmic suspension INSTILL 1 DROP INTO BOTH EYES TWICE A DAY FOR 14 DAYS    rosuvastatin (CRESTOR) 5 MG tablet Take 1 tablet by mouth Daily.    traZODone (DESYREL) 50 MG tablet Take 1 tablet by mouth Every Night.    vitamin D3 125 MCG (5000 UT) capsule capsule 1,000 Units Daily. With Vit K     No current facility-administered medications on file prior to visit.         Review of Systems   Constitutional:  Negative for fatigue.   Respiratory:  Positive for shortness of breath. Negative for cough and chest tightness.    Cardiovascular:  Positive for chest pain and palpitations. Negative for leg swelling.   Gastrointestinal:  Negative for nausea and vomiting.   Neurological:  Negative for dizziness and syncope.   Hematological:  Does not bruise/bleed easily.        Objective   Vitals:    11/09/23 1549   BP: 144/88   Pulse: 70   Weight: 88.5 kg (195 lb)   Height: 167.6 cm (66\")       "   Physical Exam  General : Alert, awake, no acute distress  Neck : Supple, no carotid bruit, no jugular venous distention  CVS : Regular rate and rhythm, no murmur, rubs or gallops  Lungs: Clear to auscultation bilaterally, no crackles or rhonchi  Abdomen: Soft, nontender, bowel sounds active  Extremities: Warm, well-perfused, no pedal edema      Result Review     The following data was reviewed by MARLO Harry  proBNP   Date Value Ref Range Status   11/19/2016 67.7 5.0 - 450.0 pg/mL Final          Lab Results   Component Value Date    TSH 1.500 11/20/2016        Lab Results   Component Value Date    TROPONINT <0.010 11/19/2016               Results for orders placed during the hospital encounter of 05/25/23    Adult Transthoracic Echo Complete W/ Cont if Necessary Per Protocol    Interpretation Summary    Left ventricular ejection fraction appears to be 56 - 60%.    Left ventricular wall thickness is consistent with mild concentric hypertrophy.    Left ventricular diastolic function was normal.    Estimated right ventricular systolic pressure from tricuspid regurgitation is normal (<35 mmHg).    Trivial to mild tricuspid regurgitation      Holter Study from 10/2/2023           Assessment and Plan   Diagnoses and all orders for this visit:    1. Palpitations (Primary)  Assessment & Plan:  She had Holter monitor study completed through her PCP, there was 1 brief episode lasting approximately 7 beats of atrial tachycardia, otherwise fairly benign Holter monitor study would not recommend changing dose of beta-blocker from these findings.      2. Nonobstructive atherosclerosis of coronary artery  Assessment & Plan:  Does not have any significant symptoms of chest pain or discomfort, however her predominant symptom is worsening exertional dyspnea.  Her previous cardiac catheterization did not show any significant coronary disease (40 % lesion in the LAD), however the symptoms could be anginal in nature.  We will  go ahead and have her set up to do a SPECT stress test, if this is normal, would recommend considering PFT and referral to pulmonology for evaluation.  Continue aspirin, beta-blocker, statin and long-acting nitrate.      3. Essential hypertension  Assessment & Plan:  Blood pressure is reasonably well-controlled, continue current regiment with carvedilol and losartan.      4. Exertional dyspnea  Assessment & Plan:  Previous echocardiogram shows normal LV systolic function, no significant valvular issues.  Will go ahead and rule out ischemia as etiology with SPECT stress test, if this is negative, then I would recommend further workup with PCP / consideration for pulmonology evaluation .    Orders:  -     Stress Test With Myocardial Perfusion One Day; Future      Follow Up   Return for Will schedule after testing completed.    Patient was given instructions and counseling regarding her condition or for health maintenance advice. Please see specific information pulled into the AVS if appropriate.     Signed,  Aruna Mullen, APRN  11/09/2023     Dictated Utilizing Dragon Dictation: Please note that portions of this note were completed with a voice recognition program.  Part of this note may be an electronic transcription/translation of spoken language to printed text using the Dragon Dictation System.

## 2023-11-09 NOTE — LETTER
2023     Luis Alcala MD  [address] [place] [address]    Patient: Mariam Rincon   YOB: 1970   Date of Visit: 2023       Dear Luis Alcala MD    Mariam Rincon was in my office today. Below is a copy of my note.    If you have questions, please do not hesitate to call me. I look forward to following Mariam along with you.         Sincerely,        MARLO Harry        CC: No Recipients    Chief Complaint  Follow-up (Holter results )    Subjective       History of Present Illness  Mariam Rincon presents to [facility] CARDIOLOGY   Ms. Rincon is a 53-year-old female patient coming in today for follow-up of complaints of chest pain shortness of breath, palpitations, and to discuss results of recent Holter study.  She had seen her PCP with complaints of palpitations, and underwent Holter monitor study.  There was 1 brief episode lasting approximately 7 beats of atrial tachycardia otherwise fairly unremarkable Holter study.  She describes continuing to have intermittent episodes of chest pain and what she describes palpitation, she also states she is very concerned because she is having continued worsening exertional shortness states simple activities such as walking outside to her vehicle leaves her feeling very winded and take a few minutes to recover.       Past History:     1) Nonobstructive coronary artery disease. Cardiac catheterization done on 2019 showed 30-40% stenosis of the mid LAD; 2) Left bundle branch block; 3) Hypertension; 4) Hyperlipidemia.     Past Medical History:   Diagnosis Date   • Arthritis    • Coronary artery disease    • Fibromyalgia    • History of transfusion    • Hyperlipidemia    • Hypertension    • Stomach ulcer        Allergies   Allergen Reactions   • Alpha-Gal Swelling   • Antihistamines, Diphenhydramine-Type Rash        Past Surgical History:   Procedure Laterality Date   •  SECTION      •  SECTION     • CHOLECYSTECTOMY     • HYSTERECTOMY      Supracervical, adhesions and pain   • TUBAL ABDOMINAL LIGATION          Social History  She  reports that she has never smoked. She has never used smokeless tobacco. She reports that she does not drink alcohol and does not use drugs.    Family History  Her family history includes Aneurysm in her brother; Breast cancer in her paternal aunt and paternal aunt; Colon cancer (age of onset: 70) in her father; Heart disease in her father and mother; Stroke in her sister.       Current Outpatient Medications on File Prior to Visit   Medication Sig   • aspirin 81 MG EC tablet Take 1 tablet by mouth Daily.   • carvedilol (COREG) 6.25 MG tablet Take 1 tablet by mouth 2 (Two) Times a Day With Meals.   • DULoxetine (CYMBALTA) 30 MG capsule Take 1 capsule by mouth Daily.   • estradiol (ESTRACE) 1 MG tablet TAKE 1 TABLET BY MOUTH EVERY DAY   • famotidine (PEPCID) 40 MG tablet Take 1 tablet by mouth Daily.   • isosorbide mononitrate (IMDUR) 30 MG 24 hr tablet Take 1 tablet by mouth Daily.   • losartan (COZAAR) 50 MG tablet Take 1.5 tablets by mouth Daily.   • nitroglycerin (NITROSTAT) 0.4 MG SL tablet Place 1 tablet under the tongue Every 5 (Five) Minutes As Needed for Chest Pain. Take no more than 3 doses in 15 minutes.   • oxybutynin XL (DITROPAN-XL) 5 MG 24 hr tablet Take 1 tablet by mouth Daily. Needs annual appt for additional refills   • pantoprazole (PROTONIX) 40 MG EC tablet Take 1 tablet by mouth Daily.   • prednisoLONE acetate (PRED FORTE) 1 % ophthalmic suspension INSTILL 1 DROP INTO BOTH EYES TWICE A DAY FOR 14 DAYS   • rosuvastatin (CRESTOR) 5 MG tablet Take 1 tablet by mouth Daily.   • traZODone (DESYREL) 50 MG tablet Take 1 tablet by mouth Every Night.   • vitamin D3 125 MCG (5000 UT) capsule capsule 1,000 Units Daily. With Vit K     No current facility-administered medications on file prior to visit.         Review of Systems   Constitutional:   "Negative for fatigue.   Respiratory:  Positive for shortness of breath. Negative for cough and chest tightness.    Cardiovascular:  Positive for chest pain and palpitations. Negative for leg swelling.   Gastrointestinal:  Negative for nausea and vomiting.   Neurological:  Negative for dizziness and syncope.   Hematological:  Does not bruise/bleed easily.        Objective  Vitals:    11/09/23 1549   BP: 144/88   Pulse: 70   Weight: 88.5 kg (195 lb)   Height: 167.6 cm (66\")         Physical Exam  General : Alert, awake, no acute distress  Neck : Supple, no carotid bruit, no jugular venous distention  CVS : Regular rate and rhythm, no murmur, rubs or gallops  Lungs: Clear to auscultation bilaterally, no crackles or rhonchi  Abdomen: Soft, nontender, bowel sounds active  Extremities: Warm, well-perfused, no pedal edema      Result Review    The following data was reviewed by MARLO Harry  proBNP   Date Value Ref Range Status   11/19/2016 67.7 5.0 - 450.0 pg/mL Final          Lab Results   Component Value Date    TSH 1.500 11/20/2016        Lab Results   Component Value Date    TROPONINT <0.010 11/19/2016               Results for orders placed during the hospital encounter of 05/25/23    Adult Transthoracic Echo Complete W/ Cont if Necessary Per Protocol    Interpretation Summary  •  Left ventricular ejection fraction appears to be 56 - 60%.  •  Left ventricular wall thickness is consistent with mild concentric hypertrophy.  •  Left ventricular diastolic function was normal.  •  Estimated right ventricular systolic pressure from tricuspid regurgitation is normal (<35 mmHg).  •  Trivial to mild tricuspid regurgitation      Holter Study from 10/2/2023           Assessment and Plan   Diagnoses and all orders for this visit:    1. Palpitations (Primary)  Assessment & Plan:  She had Holter monitor study completed through her PCP, there was 1 brief episode lasting approximately 7 beats of atrial tachycardia, otherwise " fairly benign Holter monitor study would not recommend changing dose of beta-blocker from these findings.      2. Nonobstructive atherosclerosis of coronary artery  Assessment & Plan:  Does not have any significant symptoms of chest pain or discomfort, however her predominant symptom is worsening exertional dyspnea.  Her previous cardiac catheterization did not show any significant coronary disease (40 % lesion in the LAD), however the symptoms could be anginal in nature.  We will go ahead and have her set up to do a SPECT stress test, if this is normal, would recommend considering PFT and referral to pulmonology for evaluation.  Continue aspirin, beta-blocker, statin and long-acting nitrate.      3. Essential hypertension  Assessment & Plan:  Blood pressure is reasonably well-controlled, continue current regiment with carvedilol and losartan.      4. Exertional dyspnea  Assessment & Plan:  Previous echocardiogram shows normal LV systolic function, no significant valvular issues.  Will go ahead and rule out ischemia as etiology with SPECT stress test, if this is negative, then I would recommend further workup with PCP / consideration for pulmonology evaluation .    Orders:  -     Stress Test With Myocardial Perfusion One Day; Future      Follow Up   Return for Will schedule after testing completed.    Patient was given instructions and counseling regarding her condition or for health maintenance advice. Please see specific information pulled into the AVS if appropriate.     Signed,  Aruna Mullen, APRN  11/09/2023     Dictated Utilizing Dragon Dictation: Please note that portions of this note were completed with a voice recognition program.  Part of this note may be an electronic transcription/translation of spoken language to printed text using the Dragon Dictation System.

## 2023-11-22 PROBLEM — R06.09 EXERTIONAL DYSPNEA: Status: ACTIVE | Noted: 2023-11-22

## 2023-11-23 NOTE — ASSESSMENT & PLAN NOTE
Blood pressure is reasonably well-controlled, continue current regiment with carvedilol and losartan.

## 2023-11-23 NOTE — ASSESSMENT & PLAN NOTE
Does not have any significant symptoms of chest pain or discomfort, however her predominant symptom is worsening exertional dyspnea.  Her previous cardiac catheterization did not show any significant coronary disease (40 % lesion in the LAD), however the symptoms could be anginal in nature.  We will go ahead and have her set up to do a SPECT stress test, if this is normal, would recommend considering PFT and referral to pulmonology for evaluation.  Continue aspirin, beta-blocker, statin and long-acting nitrate.

## 2023-11-23 NOTE — ASSESSMENT & PLAN NOTE
She had Holter monitor study completed through her PCP, there was 1 brief episode lasting approximately 7 beats of atrial tachycardia, otherwise fairly benign Holter monitor study would not recommend changing dose of beta-blocker from these findings.

## 2023-11-23 NOTE — ASSESSMENT & PLAN NOTE
Previous echocardiogram shows normal LV systolic function, no significant valvular issues.  Will go ahead and rule out ischemia as etiology with SPECT stress test, if this is negative, then I would recommend further workup with PCP / consideration for pulmonology evaluation .

## 2023-12-04 ENCOUNTER — TELEPHONE (OUTPATIENT)
Dept: UROLOGY | Facility: CLINIC | Age: 53
End: 2023-12-04

## 2023-12-04 DIAGNOSIS — N20.0 RENAL STONES: Primary | ICD-10-CM

## 2023-12-04 NOTE — TELEPHONE ENCOUNTER
Caller: Mariam Rincon    Relationship: Self    Best call back number: [phone]    What orders are you requesting (i.e. lab or imaging): IMAGING    In what timeframe would the patient need to come in: KUB    Where will you receive your lab/imaging services: [facility], PRIOR TO FU APPT SCHEDULED 12/11    Additional notes: PLEASE PLACE ORDERS FOR KUB.

## 2023-12-11 ENCOUNTER — TELEPHONE (OUTPATIENT)
Dept: UROLOGY | Facility: CLINIC | Age: 53
End: 2023-12-11

## 2023-12-11 DIAGNOSIS — N32.81 OAB (OVERACTIVE BLADDER): ICD-10-CM

## 2023-12-11 RX ORDER — OXYBUTYNIN CHLORIDE 5 MG/1
5 TABLET, EXTENDED RELEASE ORAL DAILY
Qty: 30 TABLET | Refills: 2 | Status: SHIPPED | OUTPATIENT
Start: 2023-12-11

## 2023-12-11 NOTE — TELEPHONE ENCOUNTER
Provider: DR TOMAS    Caller: Mariam Rincon     Relationship to Patient: SELF    Pharmacy: [facility]/pharmacy #[id number] - [place], [place] - [address] - [phone] [facility] [phone] FX     Phone Number: [phone]     Reason for Call: PT WAS CALLING TO RESCHEDULE HER APPT FOR TODAY, HER SON IS SICK. APPT HAS BEEN RESCHEDULED TO 3-13-24.    PT IS OUT OF MEDICATION AND NEEDS ENOUGH SENT TO THE PHARMACY TO GET HER TO THE APPT IN MARCH.    MEDICATION: oxybutynin XL (DITROPAN-XL) 5 MG 24 hr tablet

## 2023-12-18 DIAGNOSIS — N20.0 KIDNEY STONE: Primary | ICD-10-CM

## 2023-12-18 DIAGNOSIS — N20.0 RENAL STONES: ICD-10-CM

## 2023-12-26 ENCOUNTER — HOSPITAL ENCOUNTER (OUTPATIENT)
Dept: NUCLEAR MEDICINE | Facility: HOSPITAL | Age: 53
Discharge: HOME | End: 2023-12-26
Payer: COMMERCIAL

## 2023-12-26 DIAGNOSIS — R06.09 EXERTIONAL DYSPNEA: ICD-10-CM

## 2023-12-26 LAB
BH CV IMMEDIATE POST TECH DATA BLOOD PRESSURE: NORMAL MMHG
BH CV IMMEDIATE POST TECH DATA HEART RATE: 79 BPM
BH CV IMMEDIATE POST TECH DATA OXYGEN SATS: 98 %
BH CV REST NUCLEAR ISOTOPE DOSE: 9.2 MCI
BH CV SIX MINUTE RECOVERY TECH DATA BLOOD PRESSURE: NORMAL
BH CV SIX MINUTE RECOVERY TECH DATA HEART RATE: 75 BPM
BH CV SIX MINUTE RECOVERY TECH DATA OXYGEN SATURATION: 97 %
BH CV STRESS BP STAGE 1: NORMAL
BH CV STRESS COMMENTS STAGE 1: NORMAL
BH CV STRESS DOSE REGADENOSON STAGE 1: 0.4
BH CV STRESS DURATION MIN STAGE 1: 0
BH CV STRESS DURATION SEC STAGE 1: 10
BH CV STRESS HR STAGE 1: 82
BH CV STRESS NUCLEAR ISOTOPE DOSE: 37.1 MCI
BH CV STRESS O2 STAGE 1: 98
BH CV STRESS PROTOCOL 1: NORMAL
BH CV STRESS RECOVERY BP: NORMAL MMHG
BH CV STRESS RECOVERY HR: 75 BPM
BH CV STRESS RECOVERY O2: 97 %
BH CV STRESS STAGE 1: 1
BH CV THREE MINUTE POST TECH DATA BLOOD PRESSURE: NORMAL MMHG
BH CV THREE MINUTE POST TECH DATA HEART RATE: 73 BPM
BH CV THREE MINUTE POST TECH DATA OXYGEN SATURATION: 97 %
LV EF NUC BP: 59 %
MAXIMAL PREDICTED HEART RATE: 169 BPM
PERCENT MAX PREDICTED HR: 48.52 %
STRESS BASELINE BP: NORMAL MMHG
STRESS BASELINE HR: 63 BPM
STRESS O2 SAT REST: 97 %
STRESS PERCENT HR: 57 %
STRESS POST O2 SAT PEAK: 98 %
STRESS POST PEAK BP: NORMAL MMHG
STRESS POST PEAK HR: 82 BPM
STRESS TARGET HR: 144 BPM

## 2023-12-26 PROCEDURE — 0 TECHNETIUM TETROFOSMIN KIT: Performed by: FAMILY MEDICINE

## 2023-12-26 PROCEDURE — 25010000002 REGADENOSON 0.4 MG/5ML SOLUTION: Performed by: FAMILY MEDICINE

## 2023-12-26 PROCEDURE — 93017 CV STRESS TEST TRACING ONLY: CPT

## 2023-12-26 PROCEDURE — A9502 TC99M TETROFOSMIN: HCPCS | Performed by: FAMILY MEDICINE

## 2023-12-26 PROCEDURE — 78452 HT MUSCLE IMAGE SPECT MULT: CPT

## 2023-12-26 RX ORDER — REGADENOSON 0.08 MG/ML
0.4 INJECTION, SOLUTION INTRAVENOUS
Status: COMPLETED | OUTPATIENT
Start: 2023-12-26 | End: 2023-12-26

## 2023-12-26 RX ADMIN — TETROFOSMIN 1 DOSE: 1.38 INJECTION, POWDER, LYOPHILIZED, FOR SOLUTION INTRAVENOUS at 07:28

## 2023-12-26 RX ADMIN — REGADENOSON 0.4 MG: 0.08 INJECTION, SOLUTION INTRAVENOUS at 08:54

## 2023-12-26 RX ADMIN — TETROFOSMIN 1 DOSE: 1.38 INJECTION, POWDER, LYOPHILIZED, FOR SOLUTION INTRAVENOUS at 08:55

## 2023-12-29 ENCOUNTER — TELEPHONE (OUTPATIENT)
Dept: CARDIOLOGY | Facility: CLINIC | Age: 53
End: 2023-12-29
Payer: COMMERCIAL

## 2023-12-29 NOTE — TELEPHONE ENCOUNTER
----- Message from MARLO Harry sent at 12/27/2023  3:24 PM EST -----  Stress test results indicate good blood flow to the heart wall, based on the testing, since this testing is good, recommend following up with her PCP to discuss noncardiac etiology of ongoing symptoms of shortness of breath.

## 2024-02-03 DIAGNOSIS — Z79.890 HORMONE REPLACEMENT THERAPY (HRT): ICD-10-CM

## 2024-02-05 DIAGNOSIS — Z79.890 HORMONE REPLACEMENT THERAPY (HRT): ICD-10-CM

## 2024-02-05 RX ORDER — ESTRADIOL 1 MG/1
1 TABLET ORAL DAILY
Qty: 90 TABLET | Refills: 0 | Status: CANCELLED | OUTPATIENT
Start: 2024-02-05

## 2024-02-05 RX ORDER — ESTRADIOL 1 MG/1
TABLET ORAL
Qty: 90 TABLET | Refills: 0 | OUTPATIENT
Start: 2024-02-05

## 2024-02-05 NOTE — TELEPHONE ENCOUNTER
Caller: Mariam Rincon    Relationship: Self    Best call back number: [phone]    Requested Prescriptions:   Requested Prescriptions     Pending Prescriptions Disp Refills    estradiol (ESTRACE) 1 MG tablet 90 tablet 0     Sig: Take 1 tablet by mouth Daily.        Pharmacy where request should be sent:    [facility]  [address]  [phone]    Last office visit with prescribing clinician: Visit date not found   Last telemedicine visit with prescribing clinician: Visit date not found   Next office visit with prescribing clinician: 3/1/2024     Additional details provided by patient: N/A    Does the patient have less than a 3 day supply:  [] Yes  [x] No    Would you like a call back once the refill request has been completed: [x] Yes [] No    If the office needs to give you a call back, can they leave a voicemail: [x] Yes [] No

## 2024-02-06 NOTE — TELEPHONE ENCOUNTER
Denied refill on Estrace.  Last seen 3/11/22.  Next appointment 3/1/24.  Patient needs to be seen to get refills.  Called patient left message.

## 2024-02-27 NOTE — PROGRESS NOTES
Well Woman Visit    CC: Scheduled annual well gyn visit  Chief Complaint   Patient presents with    Annual Exam       HPI:   54 y.o.   Social History     Substance and Sexual Activity   Sexual Activity Yes    Partners: Male    Birth control/protection: Tubal ligation, Hysterectomy       55 y/o  s/p LSH with BSO here for AP.  Pt using estrace 1 mg daily.  Pt s/o everything being dry even eyes.  Wondering if this is due to hormones. Pt states has urinary incontinence and taking ditropan with some improvement.     Pt has no complaints today.    PCP: does manage PMHx and preventative labs  History: PMHx, Meds, Allergies, PSHx, Social Hx, and POBHx all reviewed and updated.    PHYSICAL EXAM:  /95   Pulse 67   Wt 87.5 kg (193 lb)   Breastfeeding No   BMI 31.15 kg/m²  Not found. Chaperone present  General- NAD, alert and oriented, appropriate  Psych- Normal mood, good memory  Neck- No masses, no thyroid enlargement  CV- Regular rhythm, no murmurs  Resp- CTA to bases, no wheezes  Abdomen- Soft, non distended, non tender, no masses    Breast left-  Bilaterally symmetrical, no masses, non tender, no nipple discharge, no axillary or supraclavicular nodes palpable.    Breast right- Bilaterally symmetrical, no masses, non tender, no nipple discharge, no axillary or supraclavicular nodes palpable.       Chaperone present during pelvic exam.  External genitalia- Normal female, no lesions  Urethra/meatus- Normal, no masses, non tender  Bladder- Normal, no masses, non tender  Vagina- Normal, no atrophy, no lesions, no discharge.      Cvx- Normal, no lesions, no discharge, No cervical motion tenderness  Uterus- Absent  Adnexa- No mass, non tender  Anus/Rectum/Perineum- Normal appearance, no mass, good sphincter tone, no hemorrhoids, no prolapse, Hemoccult NEGATIVE    Lymphatic- No palpable neck, axillary, or groin nodes  Ext- No edema, no cyanosis    Skin- No lesions, no rashes, no acanthosis  nigricans      ASSESSMENT and PLAN:    Diagnoses and all orders for this visit:    1. Well woman exam with routine gynecological exam (Primary)  -     Cancel: IgP, Aptima HPV  -     Mammo Screening Digital Tomosynthesis Bilateral With CAD; Future      Discussed with patient on maximum amount of estrogen that is recommended for hormone replacement.  I believe her overall dryness may be side effect from ditropan.  I would recommend she discuss with urology.  Preventative:  BREAST HEALTH- Monthly self breast exam importance and how to reviewed. MMG and/or MRI (prn) reviewed per society guidelines and her individual history. Screen: Updated today  CERVICAL CANCER Screening- Reviewed current ASCCP guidelines for screening w and wo cotest HPV, age specific.  Screen: Already up to date  COLON CANCER Screening- Reviewed current medical society guidelines and options.  Screen:  Pt will call to schedule  Follow up PCP/Specialist PMHx and/or Labs          She understands the importance of having any ordered tests to be performed in a timely fashion.  The risks of not performing them include, but are not limited to, advanced cancer stages, bone loss from osteoporosis and/or subsequent increase in morbidity and/or mortality.  She is encouraged to review her results online and/or contact or office if she has questions.     Follow Up:  Return in about 1 year (around 3/1/2025) for Annual physical.            Inocencia Villalta,   03/01/2024    [facility]  [address] [place] [address]  Dept: [phone]  Dept Fax: [phone]  Loc: [phone]  Loc Fax: [phone]

## 2024-03-01 ENCOUNTER — OFFICE VISIT (OUTPATIENT)
Dept: OBSTETRICS AND GYNECOLOGY | Facility: CLINIC | Age: 54
End: 2024-03-01
Payer: COMMERCIAL

## 2024-03-01 VITALS
SYSTOLIC BLOOD PRESSURE: 155 MMHG | HEART RATE: 67 BPM | DIASTOLIC BLOOD PRESSURE: 95 MMHG | BODY MASS INDEX: 31.15 KG/M2 | WEIGHT: 193 LBS

## 2024-03-01 DIAGNOSIS — Z01.419 WELL WOMAN EXAM WITH ROUTINE GYNECOLOGICAL EXAM: Primary | ICD-10-CM

## 2024-03-01 PROCEDURE — 99396 PREV VISIT EST AGE 40-64: CPT | Performed by: OBSTETRICS & GYNECOLOGY

## 2024-03-01 PROCEDURE — 99459 PELVIC EXAMINATION: CPT | Performed by: OBSTETRICS & GYNECOLOGY

## 2024-03-01 RX ORDER — DULOXETIN HYDROCHLORIDE 60 MG/1
1 CAPSULE, DELAYED RELEASE ORAL DAILY
COMMUNITY
Start: 2023-12-27

## 2024-03-01 RX ORDER — DICLOFENAC SODIUM 75 MG/1
1 TABLET, DELAYED RELEASE ORAL EVERY 12 HOURS SCHEDULED
COMMUNITY
Start: 2024-01-05

## 2024-03-01 RX ORDER — LOTEPREDNOL ETABONATE 5 MG/ML
SUSPENSION/ DROPS OPHTHALMIC
COMMUNITY
Start: 2024-02-20

## 2024-03-01 RX ORDER — LIFITEGRAST 50 MG/ML
1 SOLUTION/ DROPS OPHTHALMIC 2 TIMES DAILY
COMMUNITY

## 2024-03-04 DIAGNOSIS — Z79.890 HORMONE REPLACEMENT THERAPY (HRT): ICD-10-CM

## 2024-03-04 NOTE — TELEPHONE ENCOUNTER
Caller: Mariam Rincon    Relationship: Self    Best call back number: [phone]    Requested Prescriptions:   Requested Prescriptions     Pending Prescriptions Disp Refills    estradiol (ESTRACE) 1 MG tablet 90 tablet 0     Sig: Take 1 tablet by mouth Daily.        Pharmacy where request should be sent: [facility]/PHARMACY #[id number] - [place], [place] - [address] [facility] - [phone]  - [phone] FX     Last office visit with prescribing clinician: 3/1/2024     Next office visit with prescribing clinician: 3/3/2025     Additional details provided by patient: PHARMACY TOLD PATIENT THEY HAVE NOT RECEIVED PRESCRIPTION REFILL YET    Does the patient have less than a 3 day supply:  [x] Yes  [] No    Would you like a call back once the refill request has been completed: [x] Yes [] No    If the office needs to give you a call back, can they leave a voicemail: [x] Yes [] No    Twin Bernal Rep   03/04/24 13:26 EST

## 2024-03-05 DIAGNOSIS — N32.81 OAB (OVERACTIVE BLADDER): ICD-10-CM

## 2024-03-05 RX ORDER — OXYBUTYNIN CHLORIDE 5 MG/1
5 TABLET, EXTENDED RELEASE ORAL DAILY
Qty: 90 TABLET | Refills: 0 | Status: SHIPPED | OUTPATIENT
Start: 2024-03-05

## 2024-03-05 RX ORDER — ESTRADIOL 1 MG/1
TABLET ORAL
Qty: 90 TABLET | Refills: 0 | OUTPATIENT
Start: 2024-03-05

## 2024-03-05 RX ORDER — ESTRADIOL 1 MG/1
1 TABLET ORAL DAILY
Qty: 90 TABLET | Refills: 3 | Status: SHIPPED | OUTPATIENT
Start: 2024-03-05

## 2024-03-05 NOTE — TELEPHONE ENCOUNTER
Okay'd refill on Estrace thru 3/25.  Last seen 3/1/24.  Next appointment 3/7/25. Called patient left message Rx @ pharmacy

## 2024-03-11 ENCOUNTER — TELEPHONE (OUTPATIENT)
Dept: OBSTETRICS AND GYNECOLOGY | Facility: CLINIC | Age: 54
End: 2024-03-11
Payer: COMMERCIAL

## 2024-03-11 NOTE — TELEPHONE ENCOUNTER
UNABLE TO CONTACT / TRIED 3 TIMES 3/4/24, 3/7/24 & 3/8/24 ( SEE REFERRAL ) / LETTER MAILED TO ADDRESS ON FILE / REFERRAL TO MAMMO CLOSED

## 2024-03-14 ENCOUNTER — TELEPHONE (OUTPATIENT)
Dept: UROLOGY | Facility: CLINIC | Age: 54
End: 2024-03-14
Payer: COMMERCIAL

## 2024-03-19 NOTE — TELEPHONE ENCOUNTER
2ND CALL - CALLED PT TO OFFER R/S OF CX'D APPT 3/13 W/ THOM. PT ALSO NEEDS KUB PRIOR    SPOKE W/ PT AND R/S APPT. REMINDED HER TO DO KUB PRIOR TO APPT. PT VOICED UNDERSTANDING    Future Appointments         Provider Department Center    5/20/2024 3:00 PM Brendon Coy MD [facility] CARDIOLOGY [facility]    5/22/2024 2:00 PM Loretta Leal MD [facility] UROLOGY [facility]    7/2/2024 2:00 PM J Carlos Ramos MD [facility] PULMONARY & CRITICAL CARE MEDICINE [facility]    3/3/2025 3:40 PM Inocencia Villalta DO [facility] OBGYN [facility]

## 2024-05-10 ENCOUNTER — TRANSCRIBE ORDERS (OUTPATIENT)
Dept: ADMINISTRATIVE | Facility: HOSPITAL | Age: 54
End: 2024-05-10
Payer: COMMERCIAL

## 2024-05-10 DIAGNOSIS — Z12.31 SCREENING MAMMOGRAM, ENCOUNTER FOR: Primary | ICD-10-CM

## 2024-05-21 ENCOUNTER — TELEPHONE (OUTPATIENT)
Dept: UROLOGY | Facility: CLINIC | Age: 54
End: 2024-05-21

## 2024-05-21 NOTE — TELEPHONE ENCOUNTER
Caller: Mariam Rincon    Relationship: Self    Best call back number: [phone]    What orders are you requesting (i.e. lab or imaging): JAMES    In what timeframe would the patient need to come in: PRIOR TO APPT SCHEDULED     Where will you receive your lab/imaging services:     Additional notes: CURRENT ORDERS ON FILE WILL  PRIOR TO APPT SCHEDULED 24.    PLS PLACE NEW ORDERS, AND CALL PT TO CONFIRM WHERE IMAGING WILL NEED TO BE COMPLETED AT.  OK TO LVM.

## 2024-06-03 DIAGNOSIS — N32.81 OAB (OVERACTIVE BLADDER): ICD-10-CM

## 2024-06-03 RX ORDER — OXYBUTYNIN CHLORIDE 5 MG/1
5 TABLET, EXTENDED RELEASE ORAL DAILY
Qty: 90 TABLET | Refills: 0 | Status: SHIPPED | OUTPATIENT
Start: 2024-06-03

## 2024-06-06 ENCOUNTER — TELEPHONE (OUTPATIENT)
Dept: GASTROENTEROLOGY | Facility: CLINIC | Age: 54
End: 2024-06-06
Payer: COMMERCIAL

## 2024-06-06 NOTE — TELEPHONE ENCOUNTER
Attempted to contact patient to see if they wanted to reschedule at this time. [facility]

## 2024-06-06 NOTE — TELEPHONE ENCOUNTER
----- Message from [facility] sent at 6/3/2024  6:09 AM EDT -----  Regarding: Appointment canceled  Contact: [phone]  Appointment canceled for Mariam Rincon ([id number])  Visit Type: NURSE/MA VISIT  Date        Time      Length    Provider                  Department  6/4/2024    10:00 AM  30 mins.  NURSE/MA [facility]    Reason for Cancellation: Other    Patient Comments: I need to reschedule. I have another doctor's appointment scheduled at the same time.

## 2024-06-28 ENCOUNTER — TELEPHONE (OUTPATIENT)
Dept: GASTROENTEROLOGY | Facility: CLINIC | Age: 54
End: 2024-06-28
Payer: COMMERCIAL

## 2024-06-28 NOTE — TELEPHONE ENCOUNTER
Hub staff attempted to follow warm transfer process and was unsuccessful     Caller: Mariam Rincon    Relationship to patient: Self    Best call back number: [phone]    Patient is needing: PT CALLED TO RESCHEDULE NURSE/MA CALL FROM 6/4/2024// PLEASE CALL PT BACK

## 2024-07-02 NOTE — TELEPHONE ENCOUNTER
Attempted to call patient to reschedule DA/phone visit appointment on 06.04.24. [facility]

## 2024-07-12 ENCOUNTER — TRANSCRIBE ORDERS (OUTPATIENT)
Dept: ADMINISTRATIVE | Facility: HOSPITAL | Age: 54
End: 2024-07-12
Payer: COMMERCIAL

## 2024-07-12 ENCOUNTER — HOSPITAL ENCOUNTER (OUTPATIENT)
Dept: GENERAL RADIOLOGY | Facility: HOSPITAL | Age: 54
Discharge: HOME | End: 2024-07-12
Payer: COMMERCIAL

## 2024-07-12 ENCOUNTER — TELEPHONE (OUTPATIENT)
Dept: GASTROENTEROLOGY | Facility: CLINIC | Age: 54
End: 2024-07-12

## 2024-07-12 ENCOUNTER — OFFICE VISIT (OUTPATIENT)
Dept: GASTROENTEROLOGY | Facility: CLINIC | Age: 54
End: 2024-07-12
Payer: COMMERCIAL

## 2024-07-12 VITALS
OXYGEN SATURATION: 100 % | DIASTOLIC BLOOD PRESSURE: 85 MMHG | HEIGHT: 66 IN | HEART RATE: 71 BPM | SYSTOLIC BLOOD PRESSURE: 148 MMHG | WEIGHT: 196.8 LBS | BODY MASS INDEX: 31.63 KG/M2

## 2024-07-12 DIAGNOSIS — M79.7 FIBROMYALGIA: ICD-10-CM

## 2024-07-12 DIAGNOSIS — Z79.899 ENCOUNTER FOR LONG-TERM (CURRENT) USE OF OTHER MEDICATIONS: ICD-10-CM

## 2024-07-12 DIAGNOSIS — M25.552 PAIN OF BOTH HIP JOINTS: ICD-10-CM

## 2024-07-12 DIAGNOSIS — M25.551 PAIN OF BOTH HIP JOINTS: ICD-10-CM

## 2024-07-12 DIAGNOSIS — M25.561 PAIN IN BOTH KNEES, UNSPECIFIED CHRONICITY: ICD-10-CM

## 2024-07-12 DIAGNOSIS — M25.562 PAIN IN BOTH KNEES, UNSPECIFIED CHRONICITY: ICD-10-CM

## 2024-07-12 DIAGNOSIS — R10.9 ABDOMINAL PAIN, UNSPECIFIED ABDOMINAL LOCATION: ICD-10-CM

## 2024-07-12 DIAGNOSIS — K21.9 GASTROESOPHAGEAL REFLUX DISEASE WITHOUT ESOPHAGITIS: Primary | ICD-10-CM

## 2024-07-12 DIAGNOSIS — K59.09 CHRONIC CONSTIPATION: ICD-10-CM

## 2024-07-12 DIAGNOSIS — K62.5 RECTAL BLEEDING: ICD-10-CM

## 2024-07-12 DIAGNOSIS — M79.7 FIBROMYALGIA: Primary | ICD-10-CM

## 2024-07-12 PROCEDURE — 73562 X-RAY EXAM OF KNEE 3: CPT

## 2024-07-12 PROCEDURE — 73522 X-RAY EXAM HIPS BI 3-4 VIEWS: CPT

## 2024-07-12 RX ORDER — ALBUTEROL SULFATE 90 UG/1
AEROSOL, METERED RESPIRATORY (INHALATION) SEE ADMIN INSTRUCTIONS
COMMUNITY
Start: 2024-06-09

## 2024-07-12 RX ORDER — POLYETHYLENE GLYCOL 3350, SODIUM SULFATE, SODIUM CHLORIDE, POTASSIUM CHLORIDE, ASCORBIC ACID, SODIUM ASCORBATE 140-9-5.2G
1 KIT ORAL TAKE AS DIRECTED
Qty: 1 EACH | Refills: 0 | Status: SHIPPED | OUTPATIENT
Start: 2024-07-12 | End: 2024-07-13

## 2024-07-12 RX ORDER — ACETAMINOPHEN AND CODEINE PHOSPHATE 300; 30 MG/1; MG/1
TABLET ORAL
COMMUNITY

## 2024-07-12 RX ORDER — LANOLIN ALCOHOL/MO/W.PET/CERES
1 CREAM (GRAM) TOPICAL DAILY
COMMUNITY
Start: 2024-06-12

## 2024-07-12 NOTE — PATIENT INSTRUCTIONS
Constipation, Adult  Constipation is when a person has fewer than three bowel movements in a week, has difficulty having a bowel movement, or has stools (feces) that are dry, hard, or larger than normal. Constipation may be caused by an underlying condition. It may become worse with age if a person takes certain medicines and does not take in enough fluids.  Follow these instructions at home:  Eating and drinking    Eat foods that have a lot of fiber, such as beans, whole grains, and fresh fruits and vegetables.  Limit foods that are low in fiber and high in fat and processed sugars, such as fried or sweet foods. These include french fries, hamburgers, cookies, candies, and soda.  Drink enough fluid to keep your urine pale yellow.  General instructions  Exercise regularly or as told by your health care provider. Try to do 150 minutes of moderate exercise each week.  Use the bathroom when you have the urge to go. Do not hold it in.  Take over-the-counter and prescription medicines only as told by your health care provider. This includes any fiber supplements.  During bowel movements:  Practice deep breathing while relaxing the lower abdomen.  Practice pelvic floor relaxation.  Watch your condition for any changes. Let your health care provider know about them.  Keep all follow-up visits as told by your health care provider. This is important.  Contact a health care provider if:  You have pain that gets worse.  You have a fever.  You do not have a bowel movement after 4 days.  You vomit.  You are not hungry or you lose weight.  You are bleeding from the opening between the buttocks (anus).  You have thin, pencil-like stools.  Get help right away if:  You have a fever and your symptoms suddenly get worse.  You leak stool or have blood in your stool.  Your abdomen is bloated.  You have severe pain in your abdomen.  You feel dizzy or you faint.  Summary  Constipation is when a person has fewer than three bowel movements  in a week, has difficulty having a bowel movement, or has stools (feces) that are dry, hard, or larger than normal.  Eat foods that have a lot of fiber, such as beans, whole grains, and fresh fruits and vegetables.  Drink enough fluid to keep your urine pale yellow.  Take over-the-counter and prescription medicines only as told by your health care provider. This includes any fiber supplements.  This information is not intended to replace advice given to you by your health care provider. Make sure you discuss any questions you have with your health care provider.  Document Revised: 11/01/2023 Document Reviewed: 11/01/2023  Elsevier Patient Education © 2024 [employer] Inc.

## 2024-07-12 NOTE — TELEPHONE ENCOUNTER
2024    Dear Dr Coy,      Patient Name: Mariam Rincon  : 1970      This patient is waiting to have a Colonoscopy and/or Esophagogastroduodenoscopy which I will perform at [facility] on 24. Please respond to this request noting your recommendations regarding clearance from a Cardiac  standpoint.  You may contact our office at [phone] Option 2 with any questions. I appreciate your prompt response in this matter. Please return this form to our office as soon as possible to [phone].    ____ I approve my patient from a Cardiac  standpoint    ____ I do NOT approve my patient from a Cardiac  standpoint at this time    Please inform our office if the patient requires additional follow-up from your office prior to scheduled procedure date.      Please specify clearance expiration date:____________________________________      Approving physician name (please print): _____________________________________________      Approving physician signature: ________________________________ Date:________________  Sincerely,  [facility] - Gastroenterology   Dr. Hammond          Please fax approval or denial to our office as soon as possible.

## 2024-07-12 NOTE — PROGRESS NOTES
Chief Complaint  Rectal Bleeding (-Couple months /-bright red ), Constipation (1x week), Abdominal Pain, and Heartburn    Mariam Rincon is a 54 y.o. female who presents to [facility] on referral from No ref. provider found for a gastroenterology evaluation of rectal bleeding, constipation, abdominal pain, and heartburn.      History of Present Illness  New patient presents to the office for rectal bleeding, constipation, abdominal pain, and heartburn.  Heartburn is controlled with Protonix 40mg and Pepcid 40mg daily, she cannot reduce medication without return of symptoms. Denies nausea, vomiting, epigastric pain, and dysphagia. She has a bowel movement about 1 time a week with straining. Abdominal pain is associated with progression of constipation. She has blood with most bowel movements. She has failed senokot and stool softener. Admits to poor oral intake.     Past Medical History:   Diagnosis Date    Anxiety     Arthritis     Asthma     Coronary artery disease     Depression     Fibromyalgia     History of transfusion     Hyperlipidemia     Hypertension     Migraine     Polycystic ovary syndrome     Stomach ulcer        Past Surgical History:   Procedure Laterality Date    ABDOMINOPLASTY  2012     SECTION      , ,     CHOLECYSTECTOMY      COLONOSCOPY      SUPRACERVICAL HYSTERECTOMY SALPINGO OOPHORECTOMY  2009    TUBAL ABDOMINAL LIGATION           Current Outpatient Medications:     acetaminophen-codeine (TYLENOL with CODEINE #3) 300-30 MG per tablet, 1 TAB(S) ORALLY UPTO TWICE A DAY 10 DAYS, Disp: , Rfl:     albuterol sulfate  (90 Base) MCG/ACT inhaler, Inhale See Admin Instructions. Inhale 2 puffs by mouth every 4 to 6 hours as needed, Disp: , Rfl:     aspirin 81 MG EC tablet, Take 1 tablet by mouth Daily., Disp: 30 tablet, Rfl: 1    carvedilol (COREG) 6.25 MG tablet, Take 1 tablet by mouth 2 (Two) Times a Day With Meals., Disp: ,  Rfl:     diclofenac (VOLTAREN) 75 MG EC tablet, Take 1 tablet by mouth Every 12 (Twelve) Hours., Disp: , Rfl:     DULoxetine (CYMBALTA) 60 MG capsule, Take 1 capsule by mouth Daily., Disp: , Rfl:     estradiol (ESTRACE) 1 MG tablet, Take 1 tablet by mouth Daily., Disp: 90 tablet, Rfl: 3    famotidine (PEPCID) 40 MG tablet, Take 1 tablet by mouth Daily., Disp: , Rfl:     isosorbide mononitrate (IMDUR) 30 MG 24 hr tablet, Take 1 tablet by mouth Daily., Disp: , Rfl:     Lifitegrast (Xiidra) 5 % ophthalmic solution, Administer 1 drop to both eyes 2 (Two) Times a Day., Disp: , Rfl:     losartan (COZAAR) 50 MG tablet, Take 1.5 tablets by mouth Daily., Disp: 135 tablet, Rfl: 2    loteprednol (LOTEMAX) 0.5 % ophthalmic suspension, INSTILL 1 DROP INTO BOTH EYES 3 TIMES A DAY, Disp: , Rfl:     Magnesium Oxide -Mg Supplement 400 (240 Mg) MG tablet, Take 1 tablet by mouth Daily., Disp: , Rfl:     nitroglycerin (NITROSTAT) 0.4 MG SL tablet, Place 1 tablet under the tongue Every 5 (Five) Minutes As Needed for Chest Pain. Take no more than 3 doses in 15 minutes., Disp: 25 tablet, Rfl: 2    pantoprazole (PROTONIX) 40 MG EC tablet, Take 1 tablet by mouth Daily., Disp: , Rfl:     prednisoLONE acetate (PRED FORTE) 1 % ophthalmic suspension, INSTILL 1 DROP INTO BOTH EYES TWICE A DAY FOR 14 DAYS, Disp: , Rfl:     rosuvastatin (CRESTOR) 5 MG tablet, Take 1 tablet by mouth Daily., Disp: , Rfl:     traZODone (DESYREL) 50 MG tablet, Take 1 tablet by mouth Every Night., Disp: , Rfl:     vitamin D3 125 MCG (5000 UT) capsule capsule, 1,000 Units Daily. With Vit K, Disp: , Rfl:     linaclotide (Linzess) 72 MCG capsule capsule, Take 1 capsule by mouth Every Morning Before Breakfast for 30 days., Disp: 30 capsule, Rfl: 5    oxybutynin XL (DITROPAN-XL) 5 MG 24 hr tablet, Take 1 tablet by mouth Daily. (Patient not taking: Reported on 7/12/2024), Disp: 90 tablet, Rfl: 0    PEG-KCl-NaCl-NaSulf-Na Asc-C (Plenvu) 140 g reconstituted solution solution,  "Take 140 g by mouth Take As Directed for 1 day. Attn: Pharmacist: please see notes for coupon code., Disp: 1 each, Rfl: 0     Allergies   Allergen Reactions    Alpha-Gal Swelling    Antihistamines, Diphenhydramine-Type Rash       Family History   Problem Relation Age of Onset    Heart disease Mother     Colon cancer Father 60    Prostate cancer Father     Heart disease Father     Stroke Sister     Aneurysm Brother     Breast cancer Paternal Aunt     Breast cancer Paternal Aunt     Breast cancer Paternal Cousin     Ovarian cancer Neg Hx     Uterine cancer Neg Hx     Clotting disorder Neg Hx         Social History     Social History Narrative    Not on file       Immunization:  Immunization History   Administered Date(s) Administered    COVID-19 (PFIZER) Purple Cap Monovalent 06/08/2021, 06/29/2021        Objective     Vital Signs:   /85 (BP Location: Left arm, Patient Position: Sitting, Cuff Size: Adult)   Pulse 71   Ht 167.7 cm (66.02\")   Wt 89.3 kg (196 lb 12.8 oz)   SpO2 100%   BMI 31.74 kg/m²       Physical Exam  Constitutional:       Appearance: Normal appearance. She is normal weight.   HENT:      Head: Normocephalic and atraumatic.      Nose: Nose normal.   Pulmonary:      Effort: Pulmonary effort is normal.   Skin:     General: Skin is warm and dry.   Neurological:      Mental Status: She is alert and oriented to person, place, and time. Mental status is at baseline.   Psychiatric:         Mood and Affect: Mood normal.         Behavior: Behavior normal.         Thought Content: Thought content normal.         Judgment: Judgment normal.         Result Review :                     Assessment and Plan    Diagnoses and all orders for this visit:    1. Gastroesophageal reflux disease without esophagitis (Primary)  -     Case Request; Standing  -     Implement Anesthesia orders day of procedure.; Standing  -     Verify NPO; Standing  -     Verify bowel prep was successful; Standing  -     Give tap water " enema if bowel prep was insufficient; Standing  -     Obtain Informed Consent; Standing  -     Case Request    2. Abdominal pain, unspecified abdominal location  -     Case Request; Standing  -     Implement Anesthesia orders day of procedure.; Standing  -     Verify NPO; Standing  -     Verify bowel prep was successful; Standing  -     Give tap water enema if bowel prep was insufficient; Standing  -     Obtain Informed Consent; Standing  -     Case Request    3. Chronic constipation  -     Case Request; Standing  -     Implement Anesthesia orders day of procedure.; Standing  -     Verify NPO; Standing  -     Verify bowel prep was successful; Standing  -     Give tap water enema if bowel prep was insufficient; Standing  -     Obtain Informed Consent; Standing  -     Case Request    4. Rectal bleeding  -     Case Request; Standing  -     Implement Anesthesia orders day of procedure.; Standing  -     Verify NPO; Standing  -     Verify bowel prep was successful; Standing  -     Give tap water enema if bowel prep was insufficient; Standing  -     Obtain Informed Consent; Standing  -     Case Request    Other orders  -     PEG-KCl-NaCl-NaSulf-Na Asc-C (Plenvu) 140 g reconstituted solution solution; Take 140 g by mouth Take As Directed for 1 day. Attn: Pharmacist: please see notes for coupon code.  Dispense: 1 each; Refill: 0  -     linaclotide (Linzess) 72 MCG capsule capsule; Take 1 capsule by mouth Every Morning Before Breakfast for 30 days.  Dispense: 30 capsule; Refill: 5    Trial of Linzess 72  Advised to increase water intake.   Continue Protonix 40mg and Pepcid 40mg  Cardiac clearance from Dr. Coy  EGD/COLONOSCOPY Surgical Risk and Benefits: Possible risk/complications, benefits, and alternatives to surgical or invasive procedure have been explained to patient and/or legal guardian. Risks include bleeding, infection, and perforation. Patient has been evaluated and can tolerate anesthesia and/or sedation. Risk,  benefits, and alternatives to anesthesia and sedation have been explained to patient and/or legal guardian.     Follow Up   No follow-ups on file.  Patient was given instructions and counseling regarding her condition or for health maintenance advice. Please see specific information pulled into the AVS if appropriate.

## 2024-08-07 ENCOUNTER — TELEPHONE (OUTPATIENT)
Dept: GASTROENTEROLOGY | Facility: CLINIC | Age: 54
End: 2024-08-07
Payer: COMMERCIAL

## 2024-08-07 NOTE — TELEPHONE ENCOUNTER
S/W with pt, she wanted to change her procedure date. But while on the phone she changed her mind is going to keep the dated that she already had.

## 2024-08-15 ENCOUNTER — ANESTHESIA EVENT (OUTPATIENT)
Dept: GASTROENTEROLOGY | Facility: HOSPITAL | Age: 54
End: 2024-08-15
Payer: COMMERCIAL

## 2024-08-15 NOTE — PRE-PROCEDURE INSTRUCTIONS
"Instructed on date and arrival time of 0700. Instructed that arrival time is not their procedure time but allows time to prepare for procedure.  Come to entrance \"C\". Must have  over age 18 to drive home.  May have two visitors; however, children under 12 must stay in waiting room.  Discussed clear liquid diet (no red or purple), bowel prep, and NPO.  May take medications as usual except for blood thinners, diabetic medications, and weight loss medications.  Verbalized understanding of instructions given.  Instructed to call for questions or concerns.  Cardiac clearance noted in chart.  "

## 2024-08-15 NOTE — ANESTHESIA PREPROCEDURE EVALUATION
Anesthesia Evaluation     Nursing notes reviewed   NPO Solid Status: > 8 hours  NPO Liquid Status: > 8 hours           Airway   Mallampati: III  TM distance: <3 FB  Neck ROM: full  Possible difficult intubation  Dental - normal exam     Pulmonary    (+) asthma,shortness of breath  Cardiovascular - normal exam  Exercise tolerance: good (4-7 METS)    (+) hypertension, CAD, dysrhythmias, hyperlipidemia      Neuro/Psych  (+) headaches, psychiatric history Anxiety  GI/Hepatic/Renal/Endo    (+) GERD, PUD, GI bleeding , renal disease-    Musculoskeletal     Abdominal    Substance History      OB/GYN          Other   arthritis,     ROS/Med Hx Other: 5/23 ECHO: ·  Left ventricular ejection fraction appears to be 56 - 60%.  ·  Left ventricular wall thickness is consistent with mild concentric hypertrophy.  ·  Left ventricular diastolic function was normal.    Last cariology appt 6 months ago. No changes.   12/23 stress test: ·  Left ventricular ejection fraction is normal (Calculated EF = 59%).  ·  Resting EKG showed normal sinus rhythm with left bundle branch block.  Stress EKG was nondiagnostic due to baseline abnormalities.  ·  Myocardial perfusion images show a fixed small inferior apical defect.  No obvious reversible perfusion defects.  ·  Feel this represents a normal Lexiscan Cardiolite with no obvious ischemia.                        Anesthesia Plan    ASA 3     general     (Total IV Anesthesia    Patient understands anesthesia not responsible for dental damage.  )  intravenous induction     Anesthetic plan, risks, benefits, and alternatives have been provided, discussed and informed consent has been obtained with: patient.    Plan discussed with CRNA.        CODE STATUS:

## 2024-08-16 ENCOUNTER — ANESTHESIA (OUTPATIENT)
Dept: GASTROENTEROLOGY | Facility: HOSPITAL | Age: 54
End: 2024-08-16
Payer: COMMERCIAL

## 2024-08-16 ENCOUNTER — HOSPITAL ENCOUNTER (OUTPATIENT)
Facility: HOSPITAL | Age: 54
Setting detail: HOSPITAL OUTPATIENT SURGERY
Discharge: HOME | End: 2024-08-16
Attending: INTERNAL MEDICINE | Admitting: INTERNAL MEDICINE
Payer: COMMERCIAL

## 2024-08-16 VITALS
WEIGHT: 192.68 LBS | OXYGEN SATURATION: 97 % | HEART RATE: 55 BPM | BODY MASS INDEX: 31.08 KG/M2 | RESPIRATION RATE: 14 BRPM | TEMPERATURE: 98 F | SYSTOLIC BLOOD PRESSURE: 135 MMHG | DIASTOLIC BLOOD PRESSURE: 83 MMHG

## 2024-08-16 DIAGNOSIS — R10.9 ABDOMINAL PAIN, UNSPECIFIED ABDOMINAL LOCATION: ICD-10-CM

## 2024-08-16 DIAGNOSIS — K21.9 GASTROESOPHAGEAL REFLUX DISEASE WITHOUT ESOPHAGITIS: ICD-10-CM

## 2024-08-16 DIAGNOSIS — K59.09 CHRONIC CONSTIPATION: ICD-10-CM

## 2024-08-16 DIAGNOSIS — K62.5 RECTAL BLEEDING: ICD-10-CM

## 2024-08-16 PROCEDURE — 25010000002 PROPOFOL 10 MG/ML EMULSION: Performed by: NURSE ANESTHETIST, CERTIFIED REGISTERED

## 2024-08-16 PROCEDURE — 43239 EGD BIOPSY SINGLE/MULTIPLE: CPT | Performed by: INTERNAL MEDICINE

## 2024-08-16 PROCEDURE — 88305 TISSUE EXAM BY PATHOLOGIST: CPT | Performed by: INTERNAL MEDICINE

## 2024-08-16 PROCEDURE — 45378 DIAGNOSTIC COLONOSCOPY: CPT | Performed by: INTERNAL MEDICINE

## 2024-08-16 PROCEDURE — 25810000003 LACTATED RINGERS PER 1000 ML

## 2024-08-16 RX ORDER — PROPOFOL 10 MG/ML
VIAL (ML) INTRAVENOUS AS NEEDED
Status: DISCONTINUED | OUTPATIENT
Start: 2024-08-16 | End: 2024-08-16 | Stop reason: SURG

## 2024-08-16 RX ORDER — LIDOCAINE HYDROCHLORIDE 20 MG/ML
INJECTION, SOLUTION EPIDURAL; INFILTRATION; INTRACAUDAL; PERINEURAL AS NEEDED
Status: DISCONTINUED | OUTPATIENT
Start: 2024-08-16 | End: 2024-08-16 | Stop reason: SURG

## 2024-08-16 RX ORDER — SODIUM CHLORIDE, SODIUM LACTATE, POTASSIUM CHLORIDE, CALCIUM CHLORIDE 600; 310; 30; 20 MG/100ML; MG/100ML; MG/100ML; MG/100ML
30 INJECTION, SOLUTION INTRAVENOUS CONTINUOUS
Status: DISCONTINUED | OUTPATIENT
Start: 2024-08-16 | End: 2024-08-16 | Stop reason: HOSPADM

## 2024-08-16 RX ADMIN — LIDOCAINE HYDROCHLORIDE 40 MG: 20 INJECTION, SOLUTION INTRAVENOUS at 08:53

## 2024-08-16 RX ADMIN — PROPOFOL 50 MG: 10 INJECTION, EMULSION INTRAVENOUS at 08:53

## 2024-08-16 RX ADMIN — PROPOFOL 200 MCG/KG/MIN: 10 INJECTION, EMULSION INTRAVENOUS at 08:53

## 2024-08-16 RX ADMIN — SODIUM CHLORIDE, POTASSIUM CHLORIDE, SODIUM LACTATE AND CALCIUM CHLORIDE 30 ML/HR: 600; 310; 30; 20 INJECTION, SOLUTION INTRAVENOUS at 08:45

## 2024-08-16 NOTE — ANESTHESIA POSTPROCEDURE EVALUATION
Patient: Mariam Rincon    Procedure Summary       Date: 08/16/24 Room / Location: [facility] ENDOSCOPY 4 / [facility] ENDOSCOPY    Anesthesia Start: 0850 Anesthesia Stop: 0925    Procedures:       ESOPHAGOGASTRODUODENOSCOPY WITH BIOPSIES      COLONOSCOPY Diagnosis:       Gastroesophageal reflux disease without esophagitis      Abdominal pain, unspecified abdominal location      Chronic constipation      Rectal bleeding      (Gastroesophageal reflux disease without esophagitis [K21.9])      (Abdominal pain, unspecified abdominal location [R10.9])      (Chronic constipation [K59.09])      (Rectal bleeding [K62.5])    Surgeons: Colton Hammond MD Provider: Bozena Pozo CRNA    Anesthesia Type: general ASA Status: 3            Anesthesia Type: general    Vitals  Vitals Value Taken Time   /83 08/16/24 0956   Temp 36.7 °C (98 °F) 08/16/24 0925   Pulse 57 08/16/24 0957   Resp 14 08/16/24 0955   SpO2 97 % 08/16/24 0957   Vitals shown include unfiled device data.        Post Anesthesia Care and Evaluation    Patient location during evaluation: bedside  Patient participation: complete - patient participated  Level of consciousness: awake  Pain management: adequate    Airway patency: patent  Anesthetic complications: No anesthetic complications  PONV Status: controlled  Cardiovascular status: acceptable and stable  Respiratory status: acceptable

## 2024-08-16 NOTE — H&P
Pre Procedure History & Physical    Chief Complaint:   GERD and rectal bleeding    Subjective     HPI:   .  And rectal bleeding    Past Medical History:   Past Medical History:   Diagnosis Date    Anxiety     Arthritis     Asthma     Coronary artery disease     Depression     Fibromyalgia     History of transfusion     Hyperlipidemia     Hypertension     Migraine     Polycystic ovary syndrome     Stomach ulcer        Past Surgical History:  Past Surgical History:   Procedure Laterality Date    ABDOMINOPLASTY  2012     SECTION      , ,     CHOLECYSTECTOMY      COLONOSCOPY      SUPRACERVICAL HYSTERECTOMY SALPINGO OOPHORECTOMY  2009    TUBAL ABDOMINAL LIGATION         Family History:  Family History   Problem Relation Age of Onset    Heart disease Mother     Colon cancer Father 60    Prostate cancer Father     Heart disease Father     Stroke Sister     Aneurysm Brother     Breast cancer Paternal Aunt     Breast cancer Paternal Aunt     Breast cancer Paternal Cousin     Ovarian cancer Neg Hx     Uterine cancer Neg Hx     Clotting disorder Neg Hx        Social History:   reports that she has never smoked. She has never been exposed to tobacco smoke. She has never used smokeless tobacco. She reports that she does not drink alcohol and does not use drugs.    Medications:   Medications Prior to Admission   Medication Sig Dispense Refill Last Dose    acetaminophen-codeine (TYLENOL with CODEINE #3) 300-30 MG per tablet 1 TAB(S) ORALLY UPTO TWICE A DAY 10 DAYS       albuterol sulfate  (90 Base) MCG/ACT inhaler Inhale See Admin Instructions. Inhale 2 puffs by mouth every 4 to 6 hours as needed       aspirin 81 MG EC tablet Take 1 tablet by mouth Daily. 30 tablet 1     carvedilol (COREG) 6.25 MG tablet Take 1 tablet by mouth 2 (Two) Times a Day With Meals.       diclofenac (VOLTAREN) 75 MG EC tablet Take 1 tablet by mouth Every 12 (Twelve) Hours.       DULoxetine (CYMBALTA) 60 MG capsule Take 1  capsule by mouth Daily.       estradiol (ESTRACE) 1 MG tablet Take 1 tablet by mouth Daily. 90 tablet 3     famotidine (PEPCID) 40 MG tablet Take 1 tablet by mouth Daily.       isosorbide mononitrate (IMDUR) 30 MG 24 hr tablet Take 1 tablet by mouth Daily.       Lifitegrast (Xiidra) 5 % ophthalmic solution Administer 1 drop to both eyes 2 (Two) Times a Day.       losartan (COZAAR) 50 MG tablet Take 1.5 tablets by mouth Daily. 135 tablet 2     loteprednol (LOTEMAX) 0.5 % ophthalmic suspension INSTILL 1 DROP INTO BOTH EYES 3 TIMES A DAY       Magnesium Oxide -Mg Supplement 400 (240 Mg) MG tablet Take 1 tablet by mouth Daily.       nitroglycerin (NITROSTAT) 0.4 MG SL tablet Place 1 tablet under the tongue Every 5 (Five) Minutes As Needed for Chest Pain. Take no more than 3 doses in 15 minutes. 25 tablet 2     oxybutynin XL (DITROPAN-XL) 5 MG 24 hr tablet Take 1 tablet by mouth Daily. (Patient not taking: Reported on 7/12/2024) 90 tablet 0     pantoprazole (PROTONIX) 40 MG EC tablet Take 1 tablet by mouth Daily.       prednisoLONE acetate (PRED FORTE) 1 % ophthalmic suspension INSTILL 1 DROP INTO BOTH EYES TWICE A DAY FOR 14 DAYS       rosuvastatin (CRESTOR) 5 MG tablet Take 1 tablet by mouth Daily.       traZODone (DESYREL) 50 MG tablet Take 1 tablet by mouth Every Night.       vitamin D3 125 MCG (5000 UT) capsule capsule 1,000 Units Daily. With Vit K          Allergies:  Alpha-gal and Antihistamines, diphenhydramine-type        Objective     Weight 87.4 kg (192 lb 10.9 oz), not currently breastfeeding.    Physical Exam   Constitutional: Pt is oriented to person, place, and time and well-developed, well-nourished, and in no distress.   Mouth/Throat: Oropharynx is clear and moist.   Neck: Normal range of motion.   Cardiovascular: Normal rate, regular rhythm and normal heart sounds.    Pulmonary/Chest: Effort normal and breath sounds normal.   Abdominal: Soft. Nontender  Skin: Skin is warm and dry.   Psychiatric: Mood,  memory, affect and judgment normal.     Assessment & Plan     Diagnosis:  Persistent heartburn and rectal bleeding    Anticipated Surgical Procedure:  EGD and colonoscopy    The risks, benefits, and alternatives of this procedure have been discussed with the patient or the responsible party- the patient understands and agrees to proceed.

## 2024-08-19 LAB
CYTO UR: NORMAL
LAB AP CASE REPORT: NORMAL
LAB AP CLINICAL INFORMATION: NORMAL
PATH REPORT.FINAL DX SPEC: NORMAL
PATH REPORT.GROSS SPEC: NORMAL

## 2024-08-20 ENCOUNTER — PREP FOR SURGERY (OUTPATIENT)
Dept: OTHER | Facility: HOSPITAL | Age: 54
End: 2024-08-20
Payer: COMMERCIAL

## 2024-08-20 DIAGNOSIS — K59.09 CHRONIC CONSTIPATION: ICD-10-CM

## 2024-08-20 DIAGNOSIS — K62.5 RECTAL BLEEDING: ICD-10-CM

## 2024-08-20 DIAGNOSIS — R10.9 ABDOMINAL PAIN, UNSPECIFIED ABDOMINAL LOCATION: Primary | ICD-10-CM

## 2024-08-20 RX ORDER — VONOPRAZAN FUMARATE 26.72 MG/1
20 TABLET ORAL DAILY
Qty: 30 TABLET | Refills: 5 | Status: SHIPPED | OUTPATIENT
Start: 2024-08-20

## 2024-08-20 RX ORDER — POLYETHYLENE GLYCOL 3350, SODIUM CHLORIDE, SODIUM BICARBONATE, POTASSIUM CHLORIDE 420; 11.2; 5.72; 1.48 G/4L; G/4L; G/4L; G/4L
4000 POWDER, FOR SOLUTION ORAL ONCE
Qty: 4000 ML | Refills: 0 | Status: SHIPPED | OUTPATIENT
Start: 2024-08-20 | End: 2024-08-20

## 2024-08-23 ENCOUNTER — TELEPHONE (OUTPATIENT)
Dept: GASTROENTEROLOGY | Facility: CLINIC | Age: 54
End: 2024-08-23
Payer: COMMERCIAL

## 2024-08-23 NOTE — TELEPHONE ENCOUNTER
----- Message from Sanjuanitafarrah Maldonado sent at 8/20/2024 11:22 AM EDT -----  I have reviewed the patients upper endoscopy and pathology.  LA grade B esophagitis noted, biopsies consistent with reactive changes.  Biopsies are negative for dysplasia, metaplasia, and malignancy.  Recommend proceeding with trial of Voquezna, prescription sent to pharmacy. Please discontinue Protonix and utilize Voquezna instead. Okay to supplement with Pepcid.     I have reviewed the patient's most recent colonoscopy report which shows an adequate prep therefore needs repeat colonoscopy in 2 months.  Please troubleshoot any previous prep issues. Case request placed. 4L sent to pharmacy.

## 2024-08-23 NOTE — TELEPHONE ENCOUNTER
Attempted to contact patient, left voicemail message to return call. Provided direct contact information.

## 2024-08-26 NOTE — TELEPHONE ENCOUNTER
Attempted to call patient to see if she had any other insurance or Rx benefit cards. After submitting PA for Voquegil to plan, response was member benefit does not include pharmacy drug coverage and eligible drugs may be covered under medical. No answer LVM.    Postponing till 8/27/2024 8:00AM. Will call patient again.

## 2024-08-26 NOTE — TELEPHONE ENCOUNTER
Spoke to patient and informed of MARLO Beard result note and recommendations. Verified patient understanding.    Patient states the pharmacy told her the medication couldn't be filled.  Advised we will reach out to see if there is a pre-authorization needed.    At this time patient confirmed she is taking pantoprazole and famotidine.    Advised patient to avoid NSAIDS.  Patient states she stopped taking diclofenac.    Prep review: Plenvu    Clear liquids entire day prior: yes  Split bowel prep into two doses: yes   Holding any weight loss medications: n/a  Problems with prep: Took prep at 1600 but bowels didn't start moving until 1900  Last bowel movement prior to procedure looked like: not completely clear  What is normal BM schedule, constipation, etc.: Has a bowel movement every 3-4 days.  States she recently was prescribed Linzess, however has not been consistent with taking this medication.   States it is hard for her to remember to take meds in the morning as she takes all of her meds at night.  Patient states she will try to take as prescribed.    Patient states her past colonoscopies have been successful using Golytely.  Discussed extended bowel prep.  Patient agreeable.    However, patient requests that repeat colonoscopy be in December during her break from employment.  Patient denies any rectal bleeding at this time, she feels it was hemorrhoids previously.  Is it OK to push out that far?

## 2024-08-30 ENCOUNTER — TELEPHONE (OUTPATIENT)
Dept: GASTROENTEROLOGY | Facility: CLINIC | Age: 54
End: 2024-08-30
Payer: COMMERCIAL

## 2024-08-30 NOTE — TELEPHONE ENCOUNTER
Caller: Mariam Rincon    Relationship: Self    Best call back number: [phone]    What is the best time to reach you: ANYTIME    Who are you requesting to speak with (clinical staff, provider,  specific staff member): CLINICAL STAFF    Do you know the name of the person who called: KAREEM    What was the call regarding: UNSURE    Is it okay if the provider responds through MyChart: YES THAT IS FINE

## 2024-08-30 NOTE — TELEPHONE ENCOUNTER
2024    Dear Dr. Coy,      Patient Name: Mariam Rincon  : 1970      This patient is waiting to have a Colonoscopy which I will perform at [facility] on 25. Please respond to this request noting your recommendations regarding clearance from a Cardiac  standpoint.  You may contact our office at [phone] Option 2 with any questions. I appreciate your prompt response in this matter. Please return this form to our office as soon as possible to [phone].    ____ I approve my patient from a Cardiac  standpoint    ____ I do NOT approve my patient from a Cardiac  standpoint at this time    Please inform our office if the patient requires additional follow-up from your office prior to scheduled procedure date.      Please specify clearance expiration date:____________________________________      Approving physician name (please print): _____________________________________________      Approving physician signature: ________________________________ Date:________________  Sincerely,  [facility] - Gastroenterology   Dr. Colton Hammond      Please fax approval or denial to our office as soon as possible.

## 2024-09-16 ENCOUNTER — TELEPHONE (OUTPATIENT)
Dept: GASTROENTEROLOGY | Facility: CLINIC | Age: 54
End: 2024-09-16
Payer: COMMERCIAL

## 2024-09-19 ENCOUNTER — TELEPHONE (OUTPATIENT)
Dept: UROLOGY | Facility: CLINIC | Age: 54
End: 2024-09-19
Payer: COMMERCIAL

## 2024-09-23 ENCOUNTER — TELEPHONE (OUTPATIENT)
Dept: UROLOGY | Facility: CLINIC | Age: 54
End: 2024-09-23

## 2024-10-09 ENCOUNTER — OFFICE VISIT (OUTPATIENT)
Dept: CARDIOLOGY | Facility: CLINIC | Age: 54
End: 2024-10-09
Payer: COMMERCIAL

## 2024-10-09 VITALS
WEIGHT: 196.2 LBS | HEART RATE: 87 BPM | HEIGHT: 66 IN | DIASTOLIC BLOOD PRESSURE: 87 MMHG | BODY MASS INDEX: 31.53 KG/M2 | SYSTOLIC BLOOD PRESSURE: 152 MMHG

## 2024-10-09 DIAGNOSIS — E78.2 MIXED HYPERLIPIDEMIA: ICD-10-CM

## 2024-10-09 DIAGNOSIS — I25.10 NONOBSTRUCTIVE ATHEROSCLEROSIS OF CORONARY ARTERY: Primary | ICD-10-CM

## 2024-10-09 DIAGNOSIS — I10 ESSENTIAL HYPERTENSION: ICD-10-CM

## 2024-10-09 DIAGNOSIS — R00.2 PALPITATIONS: ICD-10-CM

## 2024-10-09 PROCEDURE — 99214 OFFICE O/P EST MOD 30 MIN: CPT | Performed by: FAMILY MEDICINE

## 2024-10-09 RX ORDER — LOSARTAN POTASSIUM 100 MG/1
100 TABLET ORAL DAILY
Qty: 90 TABLET | Refills: 1 | Status: SHIPPED | OUTPATIENT
Start: 2024-10-09

## 2024-10-09 NOTE — LETTER
2024     Luis Alcala MD  [address] [place] [address]    Patient: Mariam Rincon   YOB: 1970   Date of Visit: 10/9/2024       Dear Luis Alcala MD    Mariam Rincon was in my office today. Below is a copy of my note.    If you have questions, please do not hesitate to call me. I look forward to following Mariam along with you.         Sincerely,        MARLO Harry      Chief Complaint  Palpitations, Nonobstructive atherosclerosis of coronary artery, and Follow-up    Subjective       History of Present Illness  Mariam Rincon presents to [facility] CARDIOLOGY   Ms. Rincon is a 54-year-old female patient coming in today for cardiac follow-up.  She has complaints of ongoing and worsening palpitations.  She seen PCP for evaluation yesterday, her other primary complaint is extreme fatigue, and dose of Cymbalta is being increased.  She has started seeing pain management due to fibromyalgia, and is sleeping better now that she is taking Tylenol #3 bedtime.  She reports  good compliance with medications.       Past History:     1) Nonobstructive coronary artery disease. Cardiac catheterization done on 2019 showed 30-40% stenosis of the mid LAD; 2) Left bundle branch block; 3) Hypertension; 4) Hyperlipidemia.       Past Medical History:   Diagnosis Date   • Anxiety    • Arthritis    • Asthma    • Coronary artery disease    • Depression    • Fibromyalgia    • History of transfusion    • Hyperlipidemia    • Hypertension    • Migraine    • Polycystic ovary syndrome    • Stomach ulcer        Allergies   Allergen Reactions   • Alpha-Gal Swelling   • Antihistamines, Diphenhydramine-Type Rash        Past Surgical History:   Procedure Laterality Date   • ABDOMINOPLASTY     •  SECTION      , ,    • CHOLECYSTECTOMY     • COLONOSCOPY     • COLONOSCOPY N/A 2024    Procedure: COLONOSCOPY;  Surgeon: Colton Hammond MD;   Location: [facility] ENDOSCOPY;  Service: Gastroenterology;  Laterality: N/A;  POOR PREP, HEMORRHOIDS   • ENDOSCOPY N/A 8/16/2024    Procedure: ESOPHAGOGASTRODUODENOSCOPY WITH BIOPSIES;  Surgeon: Colton Hammond MD;  Location: [facility] ENDOSCOPY;  Service: Gastroenterology;  Laterality: N/A;  REFLUX ESOPHAGITIS   • SUPRACERVICAL HYSTERECTOMY SALPINGO OOPHORECTOMY  06/05/2009   • TUBAL ABDOMINAL LIGATION          Social History  She  reports that she has never smoked. She has never been exposed to tobacco smoke. She has never used smokeless tobacco. She reports that she does not drink alcohol and does not use drugs.    Family History  Her family history includes Aneurysm in her brother; Breast cancer in her paternal aunt, paternal aunt, and paternal cousin; Colon cancer (age of onset: 60) in her father; Heart disease in her father and mother; Prostate cancer in her father; Stroke in her sister.       Current Outpatient Medications on File Prior to Visit   Medication Sig   • acetaminophen-codeine (TYLENOL with CODEINE #3) 300-30 MG per tablet 1 TAB(S) ORALLY UPTO TWICE A DAY 10 DAYS   • albuterol sulfate  (90 Base) MCG/ACT inhaler Inhale See Admin Instructions. Inhale 2 puffs by mouth every 4 to 6 hours as needed   • aspirin 81 MG EC tablet Take 1 tablet by mouth Daily.   • carvedilol (COREG) 6.25 MG tablet Take 1 tablet by mouth 2 (Two) Times a Day With Meals.   • DULoxetine (CYMBALTA) 30 MG capsule Take 3 capsules by mouth Daily.   • estradiol (ESTRACE) 1 MG tablet Take 1 tablet by mouth Daily.   • isosorbide mononitrate (IMDUR) 30 MG 24 hr tablet Take 1 tablet by mouth Daily.   • Lifitegrast (Xiidra) 5 % ophthalmic solution Administer 1 drop to both eyes 2 (Two) Times a Day.   • loteprednol (LOTEMAX) 0.5 % ophthalmic suspension INSTILL 1 DROP INTO BOTH EYES 3 TIMES A DAY   • Magnesium Oxide -Mg Supplement 400 (240 Mg) MG tablet Take 1 tablet by mouth Daily.   • nitroglycerin (NITROSTAT) 0.4 MG SL tablet Place  "1 tablet under the tongue Every 5 (Five) Minutes As Needed for Chest Pain. Take no more than 3 doses in 15 minutes.   • prednisoLONE acetate (PRED FORTE) 1 % ophthalmic suspension INSTILL 1 DROP INTO BOTH EYES TWICE A DAY FOR 14 DAYS   • rosuvastatin (CRESTOR) 5 MG tablet Take 1 tablet by mouth Daily.   • traZODone (DESYREL) 50 MG tablet Take 1 tablet by mouth Every Night.   • vitamin D3 125 MCG (5000 UT) capsule capsule 1,000 Units Daily. With Vit K   • Vonoprazan Fumarate (Voquezna) 20 MG tablet Take 1 tablet by mouth Daily.   • [DISCONTINUED] losartan (COZAAR) 50 MG tablet Take 1.5 tablets by mouth Daily.     No current facility-administered medications on file prior to visit.         Review of Systems   Constitutional:  Positive for fatigue.   Cardiovascular:  Positive for palpitations.   Musculoskeletal:  Positive for arthralgias.        Objective  Vitals:    10/09/24 1518   BP: 152/87   Pulse: 87   Weight: 89 kg (196 lb 3.2 oz)   Height: 167.6 cm (66\")         Physical Exam  General : Alert, awake, no acute distress  Neck : Supple, no carotid bruit, no jugular venous distention  CVS : Regular rate and rhythm, no murmur, no rubs or gallops  Lungs: Clear to auscultation bilaterally, no crackles or rhonchi  Abdomen: Soft, nontender, bowel sounds active  Extremities: Warm, well-perfused, no pedal edema      Result Review    The following data was reviewed by MARLO Harry  proBNP   Date Value Ref Range Status   11/19/2016 67.7 5.0 - 450.0 pg/mL Final          Lab Results   Component Value Date    TSH 1.500 11/20/2016      No results found for: \"FREET4\"   No results found for: \"DDIMERQUANT\"  No results found for: \"MG\"   No results found for: \"DIGOXIN\"   Lab Results   Component Value Date    TROPONINT <0.010 11/19/2016           Cholesterol panel from PCPs office 10/8/2024  Total cholesterol 134  HDL   56  LDL   50  Triglycerides  142             Results for orders placed during the hospital encounter of " 05/25/23    Adult Transthoracic Echo Complete W/ Cont if Necessary Per Protocol    Interpretation Summary  •  Left ventricular ejection fraction appears to be 56 - 60%.  •  Left ventricular wall thickness is consistent with mild concentric hypertrophy.  •  Left ventricular diastolic function was normal.  •  Estimated right ventricular systolic pressure from tricuspid regurgitation is normal (<35 mmHg).  •  Trivial to mild tricuspid regurgitation    Results for orders placed during the hospital encounter of 12/26/23    Stress Test With Myocardial Perfusion One Day    Interpretation Summary  •  Left ventricular ejection fraction is normal (Calculated EF = 59%).  •  Resting EKG showed normal sinus rhythm with left bundle branch block.  Stress EKG was nondiagnostic due to baseline abnormalities.  •  Myocardial perfusion images show a fixed small inferior apical defect.  No obvious reversible perfusion defects.  •  Feel this represents a normal Lexiscan Cardiolite with no obvious ischemia.             Holter Study from 10/2/2023            Assessment and Plan   Diagnoses and all orders for this visit:    1. Nonobstructive atherosclerosis of coronary artery (Primary)  Assessment & Plan:  She is stable without any symptoms suggestive of angina.  She had SPECT stress test last year which was negative for ischemia.  Continue aspirin, beta-blocker, statin and long-acting nitrate.      2. Palpitations  Assessment & Plan:  She describes worsening symptoms of palpitations recently.  She had Holter monitor last year with some brief episodes of atrial tachycardia, and minimal ectopy.  She is regular on auscultation.  We discussed conservative measures including limiting caffeine, and adequate hydration.  We also discussed making medication adjustment, but due to fatigue, she prefers not to make adjustments to beta-blocker.  For now we will continue current dose carvedilol.      3. Essential hypertension  Assessment & Plan:  Blood  pressure is elevated in the office, continue current dose carvedilol, and increase dose of losartan from 75 mg to 100 mg daily.    Orders:  -     losartan (COZAAR) 100 MG tablet; Take 1 tablet by mouth Daily.  Dispense: 90 tablet; Refill: 1    4. Mixed hyperlipidemia  Assessment & Plan:   Well-controlled, her most recent LDL is below goal at 50.  Continue current dose rosuvastatin.              Follow Up   Return for with Dr. Coy in 6-8 mos.    Patient was given instructions and counseling regarding her condition or for health maintenance advice. Please see specific information pulled into the AVS if appropriate.     Signed,  Aruna Mullen, APRN  10/09/2024     Dictated Utilizing Dragon Dictation: Please note that portions of this note were completed with a voice recognition program.  Part of this note may be an electronic transcription/translation of spoken language to printed text using the Dragon Dictation System.

## 2024-10-09 NOTE — ASSESSMENT & PLAN NOTE
Blood pressure is elevated in the office, continue current dose carvedilol, and increase dose of losartan from 75 mg to 100 mg daily.

## 2024-10-09 NOTE — ASSESSMENT & PLAN NOTE
She is stable without any symptoms suggestive of angina.  She had SPECT stress test last year which was negative for ischemia.  Continue aspirin, beta-blocker, statin and long-acting nitrate.

## 2024-10-09 NOTE — ASSESSMENT & PLAN NOTE
She describes worsening symptoms of palpitations recently.  She had Holter monitor last year with some brief episodes of atrial tachycardia, and minimal ectopy.  She is regular on auscultation.  We discussed conservative measures including limiting caffeine, and adequate hydration.  We also discussed making medication adjustment, but due to fatigue, she prefers not to make adjustments to beta-blocker.  For now we will continue current dose carvedilol.

## 2024-10-09 NOTE — PROGRESS NOTES
Chief Complaint  Palpitations, Nonobstructive atherosclerosis of coronary artery, and Follow-up    Subjective        History of Present Illness  Mariam Rincon presents to [facility] CARDIOLOGY   Ms. Rincon is a 54-year-old female patient coming in today for cardiac follow-up.  She has complaints of ongoing and worsening palpitations.  She seen PCP for evaluation yesterday, her other primary complaint is extreme fatigue, and dose of Cymbalta is being increased.  She has started seeing pain management due to fibromyalgia, and is sleeping better now that she is taking Tylenol #3 bedtime.  She reports  good compliance with medications.       Past History:     1) Nonobstructive coronary artery disease. Cardiac catheterization done on 2019 showed 30-40% stenosis of the mid LAD; 2) Left bundle branch block; 3) Hypertension; 4) Hyperlipidemia.       Past Medical History:   Diagnosis Date    Anxiety     Arthritis     Asthma     Coronary artery disease     Depression     Fibromyalgia     History of transfusion     Hyperlipidemia     Hypertension     Migraine     Polycystic ovary syndrome     Stomach ulcer        Allergies   Allergen Reactions    Alpha-Gal Swelling    Antihistamines, Diphenhydramine-Type Rash        Past Surgical History:   Procedure Laterality Date    ABDOMINOPLASTY  2012     SECTION      , ,     CHOLECYSTECTOMY      COLONOSCOPY      COLONOSCOPY N/A 2024    Procedure: COLONOSCOPY;  Surgeon: Colton Hammond MD;  Location: [facility] ENDOSCOPY;  Service: Gastroenterology;  Laterality: N/A;  POOR PREP, HEMORRHOIDS    ENDOSCOPY N/A 2024    Procedure: ESOPHAGOGASTRODUODENOSCOPY WITH BIOPSIES;  Surgeon: Colton Hammond MD;  Location: [facility] ENDOSCOPY;  Service: Gastroenterology;  Laterality: N/A;  REFLUX ESOPHAGITIS    SUPRACERVICAL HYSTERECTOMY SALPINGO OOPHORECTOMY  2009    TUBAL ABDOMINAL LIGATION          Social History  She  reports that she  has never smoked. She has never been exposed to tobacco smoke. She has never used smokeless tobacco. She reports that she does not drink alcohol and does not use drugs.    Family History  Her family history includes Aneurysm in her brother; Breast cancer in her paternal aunt, paternal aunt, and paternal cousin; Colon cancer (age of onset: 60) in her father; Heart disease in her father and mother; Prostate cancer in her father; Stroke in her sister.       Current Outpatient Medications on File Prior to Visit   Medication Sig    acetaminophen-codeine (TYLENOL with CODEINE #3) 300-30 MG per tablet 1 TAB(S) ORALLY UPTO TWICE A DAY 10 DAYS    albuterol sulfate  (90 Base) MCG/ACT inhaler Inhale See Admin Instructions. Inhale 2 puffs by mouth every 4 to 6 hours as needed    aspirin 81 MG EC tablet Take 1 tablet by mouth Daily.    carvedilol (COREG) 6.25 MG tablet Take 1 tablet by mouth 2 (Two) Times a Day With Meals.    DULoxetine (CYMBALTA) 30 MG capsule Take 3 capsules by mouth Daily.    estradiol (ESTRACE) 1 MG tablet Take 1 tablet by mouth Daily.    isosorbide mononitrate (IMDUR) 30 MG 24 hr tablet Take 1 tablet by mouth Daily.    Lifitegrast (Xiidra) 5 % ophthalmic solution Administer 1 drop to both eyes 2 (Two) Times a Day.    loteprednol (LOTEMAX) 0.5 % ophthalmic suspension INSTILL 1 DROP INTO BOTH EYES 3 TIMES A DAY    Magnesium Oxide -Mg Supplement 400 (240 Mg) MG tablet Take 1 tablet by mouth Daily.    nitroglycerin (NITROSTAT) 0.4 MG SL tablet Place 1 tablet under the tongue Every 5 (Five) Minutes As Needed for Chest Pain. Take no more than 3 doses in 15 minutes.    prednisoLONE acetate (PRED FORTE) 1 % ophthalmic suspension INSTILL 1 DROP INTO BOTH EYES TWICE A DAY FOR 14 DAYS    rosuvastatin (CRESTOR) 5 MG tablet Take 1 tablet by mouth Daily.    traZODone (DESYREL) 50 MG tablet Take 1 tablet by mouth Every Night.    vitamin D3 125 MCG (5000 UT) capsule capsule 1,000 Units Daily. With Vit K     "Vonoprazan Fumarate (Voquezna) 20 MG tablet Take 1 tablet by mouth Daily.    [DISCONTINUED] losartan (COZAAR) 50 MG tablet Take 1.5 tablets by mouth Daily.     No current facility-administered medications on file prior to visit.         Review of Systems   Constitutional:  Positive for fatigue.   Cardiovascular:  Positive for palpitations.   Musculoskeletal:  Positive for arthralgias.        Objective   Vitals:    10/09/24 1518   BP: 152/87   Pulse: 87   Weight: 89 kg (196 lb 3.2 oz)   Height: 167.6 cm (66\")         Physical Exam  General : Alert, awake, no acute distress  Neck : Supple, no carotid bruit, no jugular venous distention  CVS : Regular rate and rhythm, no murmur, no rubs or gallops  Lungs: Clear to auscultation bilaterally, no crackles or rhonchi  Abdomen: Soft, nontender, bowel sounds active  Extremities: Warm, well-perfused, no pedal edema      Result Review     The following data was reviewed by MARLO Harry  proBNP   Date Value Ref Range Status   11/19/2016 67.7 5.0 - 450.0 pg/mL Final          Lab Results   Component Value Date    TSH 1.500 11/20/2016      No results found for: \"FREET4\"   No results found for: \"DDIMERQUANT\"  No results found for: \"MG\"   No results found for: \"DIGOXIN\"   Lab Results   Component Value Date    TROPONINT <0.010 11/19/2016           Cholesterol panel from PCPs office 10/8/2024  Total cholesterol 134  HDL   56  LDL   50  Triglycerides  142             Results for orders placed during the hospital encounter of 05/25/23    Adult Transthoracic Echo Complete W/ Cont if Necessary Per Protocol    Interpretation Summary    Left ventricular ejection fraction appears to be 56 - 60%.    Left ventricular wall thickness is consistent with mild concentric hypertrophy.    Left ventricular diastolic function was normal.    Estimated right ventricular systolic pressure from tricuspid regurgitation is normal (<35 mmHg).    Trivial to mild tricuspid regurgitation    Results for " orders placed during the hospital encounter of 12/26/23    Stress Test With Myocardial Perfusion One Day    Interpretation Summary    Left ventricular ejection fraction is normal (Calculated EF = 59%).    Resting EKG showed normal sinus rhythm with left bundle branch block.  Stress EKG was nondiagnostic due to baseline abnormalities.    Myocardial perfusion images show a fixed small inferior apical defect.  No obvious reversible perfusion defects.    Feel this represents a normal Lexiscan Cardiolite with no obvious ischemia.             Holter Study from 10/2/2023            Assessment and Plan   Diagnoses and all orders for this visit:    1. Nonobstructive atherosclerosis of coronary artery (Primary)  Assessment & Plan:  She is stable without any symptoms suggestive of angina.  She had SPECT stress test last year which was negative for ischemia.  Continue aspirin, beta-blocker, statin and long-acting nitrate.      2. Palpitations  Assessment & Plan:  She describes worsening symptoms of palpitations recently.  She had Holter monitor last year with some brief episodes of atrial tachycardia, and minimal ectopy.  She is regular on auscultation.  We discussed conservative measures including limiting caffeine, and adequate hydration.  We also discussed making medication adjustment, but due to fatigue, she prefers not to make adjustments to beta-blocker.  For now we will continue current dose carvedilol.      3. Essential hypertension  Assessment & Plan:  Blood pressure is elevated in the office, continue current dose carvedilol, and increase dose of losartan from 75 mg to 100 mg daily.    Orders:  -     losartan (COZAAR) 100 MG tablet; Take 1 tablet by mouth Daily.  Dispense: 90 tablet; Refill: 1    4. Mixed hyperlipidemia  Assessment & Plan:   Well-controlled, her most recent LDL is below goal at 50.  Continue current dose rosuvastatin.              Follow Up   Return for with Dr. Coy in 6-8 mos.    Patient was  given instructions and counseling regarding her condition or for health maintenance advice. Please see specific information pulled into the AVS if appropriate.     Signed,  Aruna Mullen, APRN  10/09/2024     Dictated Utilizing Dragon Dictation: Please note that portions of this note were completed with a voice recognition program.  Part of this note may be an electronic transcription/translation of spoken language to printed text using the Dragon Dictation System.

## 2024-10-11 ENCOUNTER — TRANSCRIBE ORDERS (OUTPATIENT)
Dept: ADMINISTRATIVE | Facility: HOSPITAL | Age: 54
End: 2024-10-11
Payer: COMMERCIAL

## 2024-11-15 ENCOUNTER — HOSPITAL ENCOUNTER (OUTPATIENT)
Dept: GENERAL RADIOLOGY | Facility: HOSPITAL | Age: 54
Discharge: HOME | End: 2024-11-15
Admitting: UROLOGY
Payer: COMMERCIAL

## 2024-11-15 DIAGNOSIS — N20.0 KIDNEY STONE: ICD-10-CM

## 2024-11-15 PROCEDURE — 74018 RADEX ABDOMEN 1 VIEW: CPT

## 2024-11-20 ENCOUNTER — OFFICE VISIT (OUTPATIENT)
Dept: UROLOGY | Age: 54
End: 2024-11-20
Payer: COMMERCIAL

## 2024-11-20 VITALS
DIASTOLIC BLOOD PRESSURE: 83 MMHG | BODY MASS INDEX: 31.5 KG/M2 | HEIGHT: 66 IN | SYSTOLIC BLOOD PRESSURE: 133 MMHG | WEIGHT: 196 LBS

## 2024-11-20 DIAGNOSIS — N20.0 KIDNEY STONE: ICD-10-CM

## 2024-11-20 DIAGNOSIS — N32.81 OAB (OVERACTIVE BLADDER): Primary | ICD-10-CM

## 2024-11-20 LAB
BILIRUB BLD-MCNC: NEGATIVE MG/DL
CLARITY, POC: CLEAR
COLOR UR: YELLOW
EXPIRATION DATE: NORMAL
GLUCOSE UR STRIP-MCNC: NEGATIVE MG/DL
KETONES UR QL: NEGATIVE
LEUKOCYTE EST, POC: NEGATIVE
Lab: NORMAL
NITRITE UR-MCNC: NEGATIVE MG/ML
PH UR: 5.5 [PH] (ref 5–8)
PROT UR STRIP-MCNC: NEGATIVE MG/DL
RBC # UR STRIP: NEGATIVE /UL
SP GR UR: 1.02 (ref 1–1.03)
UROBILINOGEN UR QL: NORMAL

## 2024-11-20 PROCEDURE — 99214 OFFICE O/P EST MOD 30 MIN: CPT | Performed by: UROLOGY

## 2024-11-20 PROCEDURE — 81003 URINALYSIS AUTO W/O SCOPE: CPT | Performed by: UROLOGY

## 2024-11-20 RX ORDER — HYDROCODONE BITARTRATE AND ACETAMINOPHEN 5; 325 MG/1; MG/1
TABLET ORAL
COMMUNITY
Start: 2024-11-19

## 2024-11-20 RX ORDER — LINACLOTIDE 72 UG/1
72 CAPSULE, GELATIN COATED ORAL
COMMUNITY
Start: 2024-10-27

## 2024-11-20 RX ORDER — FAMOTIDINE 40 MG/1
1 TABLET, FILM COATED ORAL DAILY
COMMUNITY
Start: 2024-10-30

## 2024-11-20 RX ORDER — MIRABEGRON 25 MG/1
25 TABLET, FILM COATED, EXTENDED RELEASE ORAL DAILY
Qty: 90 TABLET | Refills: 3 | Status: SHIPPED | OUTPATIENT
Start: 2024-11-20 | End: 2025-11-15

## 2024-11-20 NOTE — PROGRESS NOTES
"Chief Complaint  OAB    Subjective          Mariam Rincon presents to [facility] UROLOGY  History of Present Illness  This [employer] here for follow-up for kidney stones and overactive bladder.  She had stopped her anticholinergic as it was causing too much dryness.  She would like to try different medication.  She just reports heaviness of her bladder and some urgency.  Urine today is normal.  KUB reveals no obvious stones other than the punctate stone in her left kidney which has been stable since 2021.      Objective   Vital Signs:   /83   Ht 167.6 cm (66\")   Wt 88.9 kg (196 lb)   BMI 31.64 kg/m²       Physical Exam  Vitals and nursing note reviewed.   Constitutional:       Appearance: Normal appearance. She is well-developed.   Pulmonary:      Effort: Pulmonary effort is normal.      Breath sounds: Normal air entry.   Neurological:      Mental Status: She is alert and oriented to person, place, and time.      Motor: Motor function is intact.   Psychiatric:         Mood and Affect: Mood normal.         Behavior: Behavior normal.          Result Review :   The following data was reviewed by: Loretta Leal MD on 11/20/2024:    Results for orders placed or performed in visit on 11/20/24   POC Urinalysis Dipstick, Automated    Collection Time: 11/20/24  2:58 PM    Specimen: Urine   Result Value Ref Range    Color Yellow Yellow, Straw, Dark Yellow, Linda    Clarity, UA Clear Clear    Specific Gravity  1.025 1.005 - 1.030    pH, Urine 5.5 5.0 - 8.0    Leukocytes Negative Negative    Nitrite, UA Negative Negative    Protein, POC Negative Negative mg/dL    Glucose, UA Negative Negative mg/dL    Ketones, UA Negative Negative    Urobilinogen, UA 0.2 E.U./dL Normal, 0.2 E.U./dL    Bilirubin Negative Negative    Blood, UA Negative Negative    Lot Number [id number]     Expiration Date 92,025           Study Result    Narrative & Impression   XR ABDOMEN KUB     Date of Exam: 11/15/2024 4:35 PM " EST     Indication: Kidney stone     Comparison: None available.     Findings:  Nonobstructive bowel gas pattern. Mild to moderate amount of stool seen. Mild degenerative changes of the hips. Possible punctate left-sided nephrolith. Kidneys are partly obscured by stool. Previous cholecystectomy.     IMPRESSION:  Impression:  Kidneys are partially obscured by stool. Possible punctate left-sided nephrolith as seen on prior CT from 2021. No other acute finding.        Electronically Signed: Brendon Heath MD    11/19/2024 12:25 PM EST    Workstation ID: [id number]            Assessment and Plan    Diagnoses and all orders for this visit:    1. OAB (overactive bladder) (Primary)  -     POC Urinalysis Dipstick, Automated  -     Mirabegron ER (Myrbetriq) 25 MG tablet sustained-release 24 hour 24 hr tablet; Take 1 tablet by mouth Daily for 360 days.  Dispense: 90 tablet; Refill: 3    2. Kidney stone  -     XR Abdomen KUB; Future    Will try Myrbetriq 25 mg once a day.  She will let me know if that is not effective.  Otherwise I will see her in 1 year with a KUB prior.        Follow Up       No follow-ups on file.  Patient was given instructions and counseling regarding her condition or for health maintenance advice. Please see specific information pulled into the AVS if appropriate.

## 2025-01-02 ENCOUNTER — TELEPHONE (OUTPATIENT)
Dept: GASTROENTEROLOGY | Facility: CLINIC | Age: 55
End: 2025-01-02
Payer: COMMERCIAL

## 2025-01-02 NOTE — TELEPHONE ENCOUNTER
Attempted to call patient due to receiving a message that patient needs to r/s her procedure that is currently on 1/3/2025. Patient did not answer LVM asking for a call back. Left direst line.

## 2025-01-02 NOTE — TELEPHONE ENCOUNTER
Caller: Mariam Rincon    Relationship to patient: Self    Best call back number: [phone]    Chief complaint: PT IS NEEDING TO SCHEDULE COLONOSCOPY     Type of visit: COLONOSCOPY     Requested date: FIRST AVAILABLE     If rescheduling, when is the original appointment: 1/3/25     Additional notes:PT IS HAVING FLU LIKE SYMPTOMS AND IS NEEDING TO RESCHEDULE SCOPE.

## 2025-01-13 DIAGNOSIS — I10 ESSENTIAL HYPERTENSION: ICD-10-CM

## 2025-01-13 RX ORDER — LOSARTAN POTASSIUM 100 MG/1
100 TABLET ORAL DAILY
Qty: 90 TABLET | Refills: 1 | Status: SHIPPED | OUTPATIENT
Start: 2025-01-13

## 2025-02-10 ENCOUNTER — TELEPHONE (OUTPATIENT)
Dept: OBSTETRICS AND GYNECOLOGY | Facility: CLINIC | Age: 55
End: 2025-02-10
Payer: COMMERCIAL

## 2025-02-15 DIAGNOSIS — Z79.890 HORMONE REPLACEMENT THERAPY (HRT): ICD-10-CM

## 2025-02-17 RX ORDER — ESTRADIOL 1 MG/1
1 TABLET ORAL DAILY
Qty: 90 TABLET | Refills: 1 | Status: SHIPPED | OUTPATIENT
Start: 2025-02-17

## 2025-03-06 ENCOUNTER — TELEPHONE (OUTPATIENT)
Dept: CARDIOLOGY | Facility: CLINIC | Age: 55
End: 2025-03-06

## 2025-03-06 NOTE — TELEPHONE ENCOUNTER
Caller: Mariam Rincon    Relationship: Self    Best call back number:   Telephone Information:   Mobile [phone]        What is the best time to reach you: ANY    Who are you requesting to speak with (clinical staff, provider,  specific staff member): ARACELI    Do you know the name of the person who called: ARACELI    What was the call regarding: RETURNING A CALL FROM ARACELI       Never

## 2025-03-07 ENCOUNTER — LAB (OUTPATIENT)
Facility: HOSPITAL | Age: 55
End: 2025-03-07
Payer: COMMERCIAL

## 2025-03-07 ENCOUNTER — OFFICE VISIT (OUTPATIENT)
Dept: CARDIOLOGY | Facility: CLINIC | Age: 55
End: 2025-03-07
Payer: COMMERCIAL

## 2025-03-07 VITALS
HEART RATE: 71 BPM | DIASTOLIC BLOOD PRESSURE: 88 MMHG | SYSTOLIC BLOOD PRESSURE: 139 MMHG | WEIGHT: 195.1 LBS | BODY MASS INDEX: 31.36 KG/M2 | HEIGHT: 66 IN

## 2025-03-07 DIAGNOSIS — E78.2 MIXED HYPERLIPIDEMIA: ICD-10-CM

## 2025-03-07 DIAGNOSIS — R00.2 PALPITATIONS: ICD-10-CM

## 2025-03-07 DIAGNOSIS — I25.10 NONOBSTRUCTIVE ATHEROSCLEROSIS OF CORONARY ARTERY: Primary | ICD-10-CM

## 2025-03-07 DIAGNOSIS — I25.10 NONOBSTRUCTIVE ATHEROSCLEROSIS OF CORONARY ARTERY: ICD-10-CM

## 2025-03-07 DIAGNOSIS — I10 ESSENTIAL HYPERTENSION: ICD-10-CM

## 2025-03-07 PROCEDURE — 36415 COLL VENOUS BLD VENIPUNCTURE: CPT

## 2025-03-07 PROCEDURE — 83735 ASSAY OF MAGNESIUM: CPT

## 2025-03-07 PROCEDURE — 99214 OFFICE O/P EST MOD 30 MIN: CPT | Performed by: FAMILY MEDICINE

## 2025-03-07 PROCEDURE — 80048 BASIC METABOLIC PNL TOTAL CA: CPT

## 2025-03-07 PROCEDURE — 93000 ELECTROCARDIOGRAM COMPLETE: CPT | Performed by: FAMILY MEDICINE

## 2025-03-07 RX ORDER — ISOSORBIDE MONONITRATE 30 MG/1
60 TABLET, EXTENDED RELEASE ORAL DAILY
Qty: 60 TABLET | Refills: 3 | Status: SHIPPED | OUTPATIENT
Start: 2025-03-07

## 2025-03-07 NOTE — PROGRESS NOTES
Chief Complaint  Follow-up, Coronary Artery Disease, Hypertension, and Palpitations    Subjective        History of Present Illness  Mariam Rincon presents to [facility] CARDIOLOGY   History of Present Illness  Ms. Rincon is a 55 -year-old female coming in today for cardiac follow-up.  She has been experiencing intermittent episodes of chest discomfort , typically associated with elevated blood pressure or stress. These episodes usually subside with rest. However, she experienced a particularly severe episode on , characterized by a squeezing pressure radiating into her neck and arm, reminiscent of an episode in 2016. Symptoms persisted for several hours  She also reports intermittent chest pain throughout the week, which is exacerbated by physical exertion but quickly subsides.     Past History:     1) Nonobstructive coronary artery disease. Cardiac catheterization done on 2019 showed 30-40% stenosis of the mid LAD; 2) Left bundle branch block; 3) Hypertension; 4) Hyperlipidemia.        Past Medical History:   Diagnosis Date    Anxiety     Arthritis     Asthma     Coronary artery disease     Depression     Fibromyalgia     History of transfusion     Hyperlipidemia     Hypertension     Migraine     Polycystic ovary syndrome     Stomach ulcer        Allergies   Allergen Reactions    Alpha-Gal Swelling    Antihistamines, Diphenhydramine-Type Rash        Past Surgical History:   Procedure Laterality Date    ABDOMINOPLASTY  2012     SECTION      , ,     CHOLECYSTECTOMY      COLONOSCOPY      COLONOSCOPY N/A 2024    Procedure: COLONOSCOPY;  Surgeon: Colton Hammond MD;  Location: [facility] ENDOSCOPY;  Service: Gastroenterology;  Laterality: N/A;  POOR PREP, HEMORRHOIDS    ENDOSCOPY N/A 2024    Procedure: ESOPHAGOGASTRODUODENOSCOPY WITH BIOPSIES;  Surgeon: Colton Hammond MD;  Location: [facility] ENDOSCOPY;  Service: Gastroenterology;  Laterality: N/A;   REFLUX ESOPHAGITIS    SUPRACERVICAL HYSTERECTOMY SALPINGO OOPHORECTOMY  06/05/2009    TUBAL ABDOMINAL LIGATION          Social History  She  reports that she has never smoked. She has never been exposed to tobacco smoke. She has never used smokeless tobacco. She reports that she does not drink alcohol and does not use drugs.    Family History  Her family history includes Aneurysm in her brother; Breast cancer in her paternal aunt, paternal aunt, and paternal cousin; Colon cancer (age of onset: 60) in her father; Heart disease in her father and mother; Prostate cancer in her father; Stroke in her sister.       Current Outpatient Medications on File Prior to Visit   Medication Sig    albuterol sulfate  (90 Base) MCG/ACT inhaler Inhale See Admin Instructions. Inhale 2 puffs by mouth every 4 to 6 hours as needed    aspirin 81 MG EC tablet Take 1 tablet by mouth Daily.    carvedilol (COREG) 6.25 MG tablet Take 1 tablet by mouth 2 (Two) Times a Day With Meals.    DULoxetine (CYMBALTA) 30 MG capsule Take 3 capsules by mouth Daily.    estradiol (ESTRACE) 1 MG tablet TAKE 1 TABLET BY MOUTH EVERY DAY    famotidine (PEPCID) 40 MG tablet Take 1 tablet by mouth Daily.    HYDROcodone-acetaminophen (NORCO) 5-325 MG per tablet     Lifitegrast (Xiidra) 5 % ophthalmic solution Administer 1 drop to both eyes 2 (Two) Times a Day.    Linzess 72 MCG capsule capsule Take 1 capsule by mouth Every Morning Before Breakfast.    losartan (COZAAR) 100 MG tablet TAKE 1 TABLET BY MOUTH EVERY DAY    loteprednol (LOTEMAX) 0.5 % ophthalmic suspension INSTILL 1 DROP INTO BOTH EYES 3 TIMES A DAY    Magnesium Oxide -Mg Supplement 400 (240 Mg) MG tablet Take 1 tablet by mouth Daily.    Mirabegron ER (Myrbetriq) 25 MG tablet sustained-release 24 hour 24 hr tablet Take 1 tablet by mouth Daily for 360 days.    nitroglycerin (NITROSTAT) 0.4 MG SL tablet Place 1 tablet under the tongue Every 5 (Five) Minutes As Needed for Chest Pain. Take no more than  "3 doses in 15 minutes.    rosuvastatin (CRESTOR) 5 MG tablet Take 1 tablet by mouth Daily.    traZODone (DESYREL) 50 MG tablet Take 1 tablet by mouth Every Night.    vitamin D3 125 MCG (5000 UT) capsule capsule 1,000 Units Daily. With Vit K    acetaminophen-codeine (TYLENOL with CODEINE #3) 300-30 MG per tablet 1 TAB(S) ORALLY UPTO TWICE A DAY 10 DAYS (Patient not taking: Reported on 3/7/2025)    prednisoLONE acetate (PRED FORTE) 1 % ophthalmic suspension INSTILL 1 DROP INTO BOTH EYES TWICE A DAY FOR 14 DAYS (Patient not taking: Reported on 3/7/2025)    Vonoprazan Fumarate (Voquezna) 20 MG tablet Take 1 tablet by mouth Daily. (Patient not taking: Reported on 3/7/2025)     No current facility-administered medications on file prior to visit.         Review of Systems   Constitutional:  Negative for fatigue.   Respiratory:  Negative for cough, chest tightness and shortness of breath.    Cardiovascular:  Positive for chest pain. Negative for palpitations and leg swelling.   Gastrointestinal:  Negative for nausea and vomiting.   Neurological:  Negative for dizziness and syncope.        Objective   Vitals:    03/07/25 1540   BP: 139/88   BP Location: Left arm   Patient Position: Sitting   Cuff Size: Large Adult   Pulse: 71   Weight: 88.5 kg (195 lb 1.6 oz)   Height: 167.6 cm (66\")         Physical Exam  General : Alert, awake, no acute distress  Neck : Supple, no carotid bruit, no jugular venous distention  CVS : Regular rate and rhythm, no murmur, no rubs or gallops  Lungs: Clear to auscultation bilaterally, no crackles or rhonchi  Abdomen: Soft, nontender, bowel sounds active  Extremities: Warm, well-perfused, no pedal edema      Result Review     The following data was reviewed by MARLO Harry  proBNP   Date Value Ref Range Status   11/19/2016 67.7 5.0 - 450.0 pg/mL Final     CMP          3/7/2025    16:22   CMP   Glucose 111    BUN 11    Creatinine 0.77    EGFR 92.4    Sodium 143    Potassium 4.0    Chloride " "105    Calcium 9.5    BUN/Creatinine Ratio 14.3    Anion Gap 12.4         Lab Results   Component Value Date    TSH 1.500 11/20/2016      No results found for: \"FREET4\"   No results found for: \"DDIMERQUANT\"  Magnesium   Date Value Ref Range Status   03/07/2025 2.1 1.6 - 2.6 mg/dL Final      No results found for: \"DIGOXIN\"   Lab Results   Component Value Date    TROPONINT <0.010 11/19/2016                 Results for orders placed during the hospital encounter of 05/25/23    Adult Transthoracic Echo Complete W/ Cont if Necessary Per Protocol    Interpretation Summary    Left ventricular ejection fraction appears to be 56 - 60%.    Left ventricular wall thickness is consistent with mild concentric hypertrophy.    Left ventricular diastolic function was normal.    Estimated right ventricular systolic pressure from tricuspid regurgitation is normal (<35 mmHg).    Trivial to mild tricuspid regurgitation    Results for orders placed during the hospital encounter of 12/26/23    Stress Test With Myocardial Perfusion One Day    Interpretation Summary    Left ventricular ejection fraction is normal (Calculated EF = 59%).    Resting EKG showed normal sinus rhythm with left bundle branch block.  Stress EKG was nondiagnostic due to baseline abnormalities.    Myocardial perfusion images show a fixed small inferior apical defect.  No obvious reversible perfusion defects.    Feel this represents a normal Lexiscan Cardiolite with no obvious ischemia.        ECG 12 Lead    Date/Time: 3/7/2025 3:59 PM  Performed by: Aruna Mullen APRN    Authorized by: Aruna Mullen APRN  Comparison: compared with previous ECG from 3/21/2021  Similar to previous ECG  Rhythm: sinus rhythm  Rate: normal  Conduction: left bundle branch block  QRS axis: normal    Clinical impression: abnormal EKG            Assessment and Plan   Diagnoses and all orders for this visit:    1. Nonobstructive atherosclerosis of coronary artery (Primary)  Assessment & " Plan:  She had improvement in symptoms being on long-acting nitrate, we will go ahead and increase dose of long-acting nitrate to see if this improves her symptoms.  She can continue aspirin and beta-blocker as well.    Orders:  -     isosorbide mononitrate (IMDUR) 30 MG 24 hr tablet; Take 2 tablets by mouth Daily.  Dispense: 60 tablet; Refill: 3  -     ECG 12 Lead  -     Basic Metabolic Panel; Future  -     Magnesium; Future    2. Palpitations  Assessment & Plan:  Continues to have some symptoms of palpitations, this may have been worsened after recent COVID infection, we will go ahead and continue current dose of beta-blocker, but we will check her electrolytes and magnesium level as well.    Orders:  -     Basic Metabolic Panel; Future  -     Magnesium; Future    3. Essential hypertension  Assessment & Plan:  Pressure is reasonably well-controlled, continue current dose of carvedilol and losartan.      4. Mixed hyperlipidemia  Assessment & Plan:   Well-controlled, continue current dose rosuvastatin.                Follow Up   Return in about 6 months (around 9/7/2025) for with Dr. Coy.  Or sooner if no improvement    Patient was given instructions and counseling regarding her condition or for health maintenance advice. Please see specific information pulled into the AVS if appropriate.     Signed,  MARLO Harry  03/07/2025     Patient or patient representative verbalized consent for the use of Ambient Listening during the visit with  MARLO Harry for chart documentation. 3/22/2025  15:44 EST    Dictated Utilizing Dragon Dictation: Please note that portions of this note were completed with a voice recognition program.  Part of this note may be an electronic transcription/translation of spoken language to printed text using the Dragon Dictation System.

## 2025-03-08 LAB
ANION GAP SERPL CALCULATED.3IONS-SCNC: 12.4 MMOL/L (ref 5–15)
BUN SERPL-MCNC: 11 MG/DL (ref 6–20)
BUN/CREAT SERPL: 14.3 (ref 7–25)
CALCIUM SPEC-SCNC: 9.5 MG/DL (ref 8.6–10.5)
CHLORIDE SERPL-SCNC: 105 MMOL/L (ref 98–107)
CO2 SERPL-SCNC: 25.6 MMOL/L (ref 22–29)
CREAT SERPL-MCNC: 0.77 MG/DL (ref 0.57–1)
EGFRCR SERPLBLD CKD-EPI 2021: 92.4 ML/MIN/1.73
GLUCOSE SERPL-MCNC: 111 MG/DL (ref 65–99)
MAGNESIUM SERPL-MCNC: 2.1 MG/DL (ref 1.6–2.6)
POTASSIUM SERPL-SCNC: 4 MMOL/L (ref 3.5–5.2)
SODIUM SERPL-SCNC: 143 MMOL/L (ref 136–145)

## 2025-03-12 DIAGNOSIS — Z79.890 HORMONE REPLACEMENT THERAPY (HRT): ICD-10-CM

## 2025-03-12 RX ORDER — ESTRADIOL 1 MG/1
1 TABLET ORAL DAILY
Qty: 90 TABLET | Refills: 1 | OUTPATIENT
Start: 2025-03-12

## 2025-03-12 NOTE — TELEPHONE ENCOUNTER
Caller: Mariam Rincon    Relationship: Self    Best call back number: [phone]      Requested Prescriptions:   Requested Prescriptions     Pending Prescriptions Disp Refills    estradiol (ESTRACE) 1 MG tablet 90 tablet 1     Sig: Take 1 tablet by mouth Daily.        Pharmacy where request should be sent: [facility]/PHARMACY #[id number] - [place], [place] - [address] [facility] - [phone]  - [phone] FX     Last office visit with prescribing clinician: Visit date not found   Last telemedicine visit with prescribing clinician: Visit date not found   Next office visit with prescribing clinician: 4/29/2025     Does the patient have less than a 3 day supply:  [] Yes  [x] No

## 2025-03-12 NOTE — TELEPHONE ENCOUNTER
I received a refill request for estradiol 1 mg tablet.  The pt was last seen on 03/01/24 and has a follow up visit on 04/29/25 with MARLO Bolton.  The Rx was last sent to the pharmacy on 02/17/25 #90 with 1 additional refill and the pt takes this medication 1 po qd.  She currently has a 6 month supply and doesn't need a refill at this time.  She will just need to contact her pharmacy for her next 3 month supply.

## 2025-03-22 ENCOUNTER — RESULTS FOLLOW-UP (OUTPATIENT)
Dept: CARDIOLOGY | Facility: CLINIC | Age: 55
End: 2025-03-22
Payer: COMMERCIAL

## 2025-03-23 NOTE — ASSESSMENT & PLAN NOTE
She had improvement in symptoms being on long-acting nitrate, we will go ahead and increase dose of long-acting nitrate to see if this improves her symptoms.  She can continue aspirin and beta-blocker as well.

## 2025-03-23 NOTE — ASSESSMENT & PLAN NOTE
Continues to have some symptoms of palpitations, this may have been worsened after recent COVID infection, we will go ahead and continue current dose of beta-blocker, but we will check her electrolytes and magnesium level as well.

## 2025-04-07 ENCOUNTER — TELEPHONE (OUTPATIENT)
Dept: UROLOGY | Age: 55
End: 2025-04-07
Payer: COMMERCIAL

## 2025-04-07 NOTE — TELEPHONE ENCOUNTER
Spoke with patient informing her that we have no control over what price is given to patients on medication. Insurance formularies can change at least 4 times a year. Patient will need to speak with her insurance company. Also we have not received any prior authorizations to complete. Patient states that she had too many side effects from the oxybutynin.

## 2025-04-07 NOTE — TELEPHONE ENCOUNTER
PATIENT CALLED AND SAID SHE WAS ON MIRABEGRON ALL LAST YEAR, AND HAD NO COPAY.  THE SAME MEDICATION NOW IS GOING TO COST $800.00 PLUS.  THERE HAVE BEEN NO CHANGES IN HER INSURANCE.    #[phone]

## 2025-04-08 NOTE — TELEPHONE ENCOUNTER
PATIENT CALLED.  SHE CALLED HER INSURANCE.  THEY TOLD HER THE COST WAS DUE TO HER HAVING NOT MET HER DEDUCTIBLE YET.  PATIENT SAID SHE CAN'T AFFORD THE $900.00 COPAY.    SHE ASKED IF THERE ARE SAMPLES OR SOMETHING ELSE SHE CAN TRY.

## 2025-04-09 DIAGNOSIS — N32.81 OAB (OVERACTIVE BLADDER): Primary | ICD-10-CM

## 2025-04-14 ENCOUNTER — HOSPITAL ENCOUNTER (OUTPATIENT)
Facility: HOSPITAL | Age: 55
Discharge: HOME | End: 2025-04-14
Admitting: UROLOGY
Payer: COMMERCIAL

## 2025-04-14 DIAGNOSIS — N20.0 KIDNEY STONE: Primary | ICD-10-CM

## 2025-04-14 DIAGNOSIS — N20.0 KIDNEY STONE: ICD-10-CM

## 2025-04-14 PROCEDURE — 74018 RADEX ABDOMEN 1 VIEW: CPT

## 2025-04-16 ENCOUNTER — TRANSCRIBE ORDERS (OUTPATIENT)
Dept: ADMINISTRATIVE | Facility: HOSPITAL | Age: 55
End: 2025-04-16
Payer: COMMERCIAL

## 2025-04-16 DIAGNOSIS — Z12.31 VISIT FOR SCREENING MAMMOGRAM: Primary | ICD-10-CM

## 2025-05-14 ENCOUNTER — HOSPITAL ENCOUNTER (OUTPATIENT)
Dept: MAMMOGRAPHY | Facility: HOSPITAL | Age: 55
Discharge: HOME | End: 2025-05-14
Admitting: NURSE PRACTITIONER
Payer: COMMERCIAL

## 2025-05-14 DIAGNOSIS — Z12.31 VISIT FOR SCREENING MAMMOGRAM: ICD-10-CM

## 2025-05-14 PROCEDURE — 77063 BREAST TOMOSYNTHESIS BI: CPT

## 2025-05-14 PROCEDURE — 77067 SCR MAMMO BI INCL CAD: CPT

## 2025-05-21 DIAGNOSIS — N32.81 OAB (OVERACTIVE BLADDER): ICD-10-CM

## 2025-06-03 DIAGNOSIS — N32.81 OAB (OVERACTIVE BLADDER): Primary | ICD-10-CM

## 2025-06-03 RX ORDER — TOLTERODINE 4 MG/1
4 CAPSULE, EXTENDED RELEASE ORAL DAILY
Qty: 90 CAPSULE | Refills: 3 | Status: SHIPPED | OUTPATIENT
Start: 2025-06-03 | End: 2026-05-29

## 2025-06-06 DIAGNOSIS — I25.10 NONOBSTRUCTIVE ATHEROSCLEROSIS OF CORONARY ARTERY: ICD-10-CM

## 2025-06-06 RX ORDER — ISOSORBIDE MONONITRATE 30 MG/1
60 TABLET, EXTENDED RELEASE ORAL DAILY
Qty: 180 TABLET | Refills: 1 | Status: SHIPPED | OUTPATIENT
Start: 2025-06-06

## 2025-07-11 DIAGNOSIS — M25.561 RIGHT KNEE PAIN, UNSPECIFIED CHRONICITY: Primary | ICD-10-CM

## 2025-07-11 DIAGNOSIS — M25.562 LEFT KNEE PAIN, UNSPECIFIED CHRONICITY: ICD-10-CM

## 2025-07-16 ENCOUNTER — TELEPHONE (OUTPATIENT)
Dept: ORTHOPEDIC SURGERY | Facility: CLINIC | Age: 55
End: 2025-07-16
Payer: COMMERCIAL

## 2025-07-16 NOTE — TELEPHONE ENCOUNTER
Spoke with patient, she is aware.  Wnt    Attempted to reach patient regarding appointment with provider- Dr. Bryn Santos - on 7/18/2025.  Patient will need to have an xray prior to coming to visit as we do not have xray in office current.  Xrays can be performed at any [facility] facility - [facility] in [place] is open 7:00 am to 6:00 pm.  Orders are in system.  Left detailed voicemail for patient.    Patient may call office at [phone] if they have questions.      OK FOR HUB TO RELAY MESSAGE TO PATIENT

## 2025-07-17 ENCOUNTER — HOSPITAL ENCOUNTER (OUTPATIENT)
Dept: GENERAL RADIOLOGY | Facility: HOSPITAL | Age: 55
Discharge: HOME | End: 2025-07-17
Payer: COMMERCIAL

## 2025-07-17 DIAGNOSIS — M25.561 RIGHT KNEE PAIN, UNSPECIFIED CHRONICITY: ICD-10-CM

## 2025-07-17 DIAGNOSIS — M25.562 LEFT KNEE PAIN, UNSPECIFIED CHRONICITY: ICD-10-CM

## 2025-07-17 PROCEDURE — 73562 X-RAY EXAM OF KNEE 3: CPT

## 2025-07-18 ENCOUNTER — OFFICE VISIT (OUTPATIENT)
Dept: ORTHOPEDIC SURGERY | Facility: CLINIC | Age: 55
End: 2025-07-18
Payer: COMMERCIAL

## 2025-07-18 VITALS
SYSTOLIC BLOOD PRESSURE: 137 MMHG | HEART RATE: 66 BPM | HEIGHT: 66 IN | DIASTOLIC BLOOD PRESSURE: 90 MMHG | OXYGEN SATURATION: 97 % | WEIGHT: 191 LBS | BODY MASS INDEX: 30.7 KG/M2

## 2025-07-18 DIAGNOSIS — M25.462 EFFUSION OF LEFT KNEE: ICD-10-CM

## 2025-07-18 DIAGNOSIS — M25.562 LEFT KNEE PAIN, UNSPECIFIED CHRONICITY: Primary | ICD-10-CM

## 2025-07-22 NOTE — PROGRESS NOTES
"Chief Complaint  Initial Evaluation of the Left Knee       Subjective      Mariam Rincon presents to [facility] for an initial evaluation of her left knee. She states she has been having knee pain for the last couple of years but has gotten worse here recently. She states that she has swelling on the inside part of her knee. She denies any trauma, locking, catching or giving way.     Allergies   Allergen Reactions    Alpha-Gal Swelling    Antihistamines, Diphenhydramine-Type Rash        Social History     Socioeconomic History    Marital status:    Tobacco Use    Smoking status: Never     Passive exposure: Never    Smokeless tobacco: Never   Vaping Use    Vaping status: Never Used   Substance and Sexual Activity    Alcohol use: No    Drug use: No    Sexual activity: Yes     Partners: Male     Birth control/protection: Tubal ligation, Hysterectomy        I reviewed the patient's chief complaint, history of present illness, review of systems, past medical history, surgical history, family history, social history, medications, and allergy list.     Review of Systems     Constitutional: Denies fevers, chills, weight loss  Cardiovascular: Denies chest pain, shortness of breath  Skin: Denies rashes, acute skin changes  Neurologic: Denies headache, loss of consciousness  MSK: left knee pain      Vital Signs:   /90 (BP Location: Left arm, Patient Position: Sitting, Cuff Size: Adult)   Pulse 66   Ht 167.6 cm (65.98\")   Wt 86.6 kg (191 lb)   SpO2 97%   BMI 30.84 kg/m²          Physical Exam  General: Alert. No acute distress    Left lower extremity: positive tenderness over the medial joint line, non tender to the lateral joint line, positive apply's, negative Lachman's, positive Chan's ,  stable varus/valgus, positive effusion, stable to the anterior/ posterior drawer, calf soft, distal neurovascularly intact, positive pulses, positve EHL, FHL, GS, and TA. Sensation " intact to light touch all 5 nerves.     Ortho Exam              Procedures        Imaging Results (Most Recent)       None             Result Review :         XR Knee 3 View Left  Result Date: 7/21/2025  Narrative: XR KNEE 3 VW LEFT, XR KNEE 3 VW RIGHT Date of Exam: 7/17/2025 3:40 PM EDT Indication: left knee pain Comparison: 7/12/2024 FINDINGS: Right: Mild tricompartmental osteophyte formation. Mild tibiofemoral and patellofemoral joint space narrowing. No definite knee effusion. No acute fracture or malalignment. Sclerotic focus of the proximal fibula, as before, likely the sequelae of a nonossifying  fibroma. Left: Tricompartmental osteophytosis with mild tibiofemoral and patellofemoral joint space narrowing. No definite joint effusion. No displaced fracture is identified. Mineralization and alignment appear within normal limits.     Impression: Mild bilateral tricompartmental osteoarthritis. Electronically Signed: James Gray  7/21/2025 11:51 AM EDT  Workstation ID: [id number]    XR Knee 3 View Right  Result Date: 7/21/2025  Narrative: XR KNEE 3 VW LEFT, XR KNEE 3 VW RIGHT Date of Exam: 7/17/2025 3:40 PM EDT Indication: left knee pain Comparison: 7/12/2024 FINDINGS: Right: Mild tricompartmental osteophyte formation. Mild tibiofemoral and patellofemoral joint space narrowing. No definite knee effusion. No acute fracture or malalignment. Sclerotic focus of the proximal fibula, as before, likely the sequelae of a nonossifying  fibroma. Left: Tricompartmental osteophytosis with mild tibiofemoral and patellofemoral joint space narrowing. No definite joint effusion. No displaced fracture is identified. Mineralization and alignment appear within normal limits.     Impression: Mild bilateral tricompartmental osteoarthritis. Electronically Signed: James Gray  7/21/2025 11:51 AM EDT  Workstation ID: [id number]             Assessment and Plan     Diagnoses and all orders for this visit:    1. Left knee pain, unspecified  chronicity (Primary)  -     MRI Knee Left Without Contrast; Future    2. Effusion of left knee      The patient presents here today for an evaluation  of her left knee.     MRI order placed today on her left knee to evaluate for internal derangement. She will  continue over the counter medicine.     Home exercises given today.     Call or return if worsening symptoms.    Follow Up     After MRI     Patient was given instructions and counseling regarding her condition or for health maintenance advice. Please see specific information pulled into the AVS if appropriate.     TranScribed for Bryn Santos MD by Starr Rose.  07/22/25   10:42 EDT      I have personally performed the services described in this document as scribed by the above individual and it is both accurate and complete. Bryn Santos MD 07/23/25

## 2025-07-23 DIAGNOSIS — I10 ESSENTIAL HYPERTENSION: ICD-10-CM

## 2025-07-23 RX ORDER — LOSARTAN POTASSIUM 100 MG/1
100 TABLET ORAL DAILY
Qty: 90 TABLET | Refills: 1 | Status: SHIPPED | OUTPATIENT
Start: 2025-07-23

## 2025-07-26 DIAGNOSIS — Z79.890 HORMONE REPLACEMENT THERAPY (HRT): ICD-10-CM

## 2025-07-28 RX ORDER — ESTRADIOL 1 MG/1
1 TABLET ORAL DAILY
Qty: 90 TABLET | Refills: 1 | OUTPATIENT
Start: 2025-07-28

## 2025-08-19 DIAGNOSIS — Z79.890 HORMONE REPLACEMENT THERAPY (HRT): ICD-10-CM

## 2025-08-21 RX ORDER — ESTRADIOL 1 MG/1
1 TABLET ORAL DAILY
Qty: 90 TABLET | Refills: 1 | OUTPATIENT
Start: 2025-08-21

## 2025-08-22 ENCOUNTER — OFFICE VISIT (OUTPATIENT)
Dept: OBSTETRICS AND GYNECOLOGY | Age: 55
End: 2025-08-22
Payer: COMMERCIAL

## 2025-08-22 VITALS
DIASTOLIC BLOOD PRESSURE: 75 MMHG | WEIGHT: 186 LBS | HEART RATE: 76 BPM | HEIGHT: 66 IN | SYSTOLIC BLOOD PRESSURE: 135 MMHG | BODY MASS INDEX: 29.89 KG/M2

## 2025-08-22 DIAGNOSIS — Z01.419 WELL WOMAN EXAM: Primary | ICD-10-CM

## 2025-08-22 DIAGNOSIS — Z79.890 HORMONE REPLACEMENT THERAPY (HRT): ICD-10-CM

## 2025-08-22 DIAGNOSIS — N89.8 VAGINAL DRYNESS: ICD-10-CM

## 2025-08-22 DIAGNOSIS — Z80.0 FAMILY HISTORY OF COLON CANCER: ICD-10-CM

## 2025-08-22 DIAGNOSIS — Z91.89 AT HIGH RISK FOR BREAST CANCER: ICD-10-CM

## 2025-08-22 DIAGNOSIS — Z12.31 ENCOUNTER FOR SCREENING MAMMOGRAM FOR MALIGNANT NEOPLASM OF BREAST: ICD-10-CM

## 2025-08-22 RX ORDER — ESTRADIOL 1 MG/1
1 TABLET ORAL DAILY
Qty: 90 TABLET | Refills: 4 | Status: SHIPPED | OUTPATIENT
Start: 2025-08-22

## 2025-08-22 RX ORDER — ESTRADIOL 0.1 MG/G
CREAM VAGINAL
Qty: 42.5 G | Refills: 4 | Status: SHIPPED | OUTPATIENT
Start: 2025-08-22 | End: 2026-09-05

## 2025-08-26 LAB
CYTOLOGIST CVX/VAG CYTO: NORMAL
CYTOLOGY CVX/VAG DOC CYTO: NORMAL
CYTOLOGY CVX/VAG DOC THIN PREP: NORMAL
DX ICD CODE: NORMAL
HPV I/H RISK 4 DNA CVX QL PROBE+SIG AMP: NEGATIVE
OTHER STN SPEC: NORMAL
SERVICE CMNT-IMP: NORMAL
STAT OF ADQ CVX/VAG CYTO-IMP: NORMAL

## (undated) DEVICE — Device

## (undated) DEVICE — Device: Brand: DEFENDO AIR/WATER/SUCTION AND BIOPSY VALVE

## (undated) DEVICE — LINER SURG CANSTR SXN S/RIGD 1500CC

## (undated) DEVICE — SOLIDIFIER LIQLOC PLS 1500CC BT

## (undated) DEVICE — CONN JET HYDRA H20 AUXILIARY DISP

## (undated) DEVICE — SINGLE-USE BIOPSY FORCEPS: Brand: RADIAL JAW 4

## (undated) DEVICE — SOL IRRG H2O PL/BG 1000ML STRL

## (undated) DEVICE — BLCK/BITE BLOX WO/DENTL/RIM W/STRAP 54F